# Patient Record
Sex: MALE | Race: WHITE | NOT HISPANIC OR LATINO | Employment: FULL TIME | URBAN - METROPOLITAN AREA
[De-identification: names, ages, dates, MRNs, and addresses within clinical notes are randomized per-mention and may not be internally consistent; named-entity substitution may affect disease eponyms.]

---

## 2017-01-13 DIAGNOSIS — E78.5 HYPERLIPEMIA: ICD-10-CM

## 2017-01-13 RX ORDER — ATORVASTATIN CALCIUM 10 MG/1
TABLET, FILM COATED ORAL
Qty: 90 TABLET | Refills: 1 | Status: SHIPPED | OUTPATIENT
Start: 2017-01-13 | End: 2017-07-31 | Stop reason: SDUPTHER

## 2017-02-20 ENCOUNTER — PATIENT MESSAGE (OUTPATIENT)
Dept: INTERNAL MEDICINE | Facility: CLINIC | Age: 62
End: 2017-02-20

## 2017-02-20 ENCOUNTER — TELEPHONE (OUTPATIENT)
Dept: INTERNAL MEDICINE | Facility: CLINIC | Age: 62
End: 2017-02-20

## 2017-02-20 ENCOUNTER — TELEPHONE (OUTPATIENT)
Dept: ENDOCRINOLOGY | Facility: CLINIC | Age: 62
End: 2017-02-20

## 2017-02-20 DIAGNOSIS — Z12.5 PROSTATE CANCER SCREENING: ICD-10-CM

## 2017-02-20 DIAGNOSIS — E11.42 TYPE 2 DIABETES MELLITUS WITH POLYNEUROPATHY: ICD-10-CM

## 2017-02-20 DIAGNOSIS — I10 UNSPECIFIED ESSENTIAL HYPERTENSION: ICD-10-CM

## 2017-02-20 DIAGNOSIS — E11.42 DIABETIC POLYNEUROPATHY ASSOCIATED WITH TYPE 2 DIABETES MELLITUS: Primary | ICD-10-CM

## 2017-02-20 RX ORDER — GLIMEPIRIDE 2 MG/1
2 TABLET ORAL
Qty: 30 TABLET | Refills: 11 | Status: SHIPPED | OUTPATIENT
Start: 2017-02-20 | End: 2018-02-22 | Stop reason: SDUPTHER

## 2017-02-20 NOTE — TELEPHONE ENCOUNTER
----- Message from Amy Duff sent at 2/20/2017  1:48 PM CST -----  Contact: Self  Doctor appointment and lab have been scheduled.  Please link lab orders to the lab appointment.  Date of doctor appointment: 4/18   Physical or EP:  Physical  Date of lab appointment:  4/10  Comments:

## 2017-02-20 NOTE — TELEPHONE ENCOUNTER
Spoke with pt, instructed he has not had a follow up visit since 2015 and the refill was denied, pt states he has been following up with PCP an will have his PCP handle the refill. Instructed pt to let us know if he would like to be seen again.

## 2017-04-10 ENCOUNTER — LAB VISIT (OUTPATIENT)
Dept: LAB | Facility: HOSPITAL | Age: 62
End: 2017-04-10
Attending: INTERNAL MEDICINE
Payer: COMMERCIAL

## 2017-04-10 DIAGNOSIS — I10 UNSPECIFIED ESSENTIAL HYPERTENSION: ICD-10-CM

## 2017-04-10 DIAGNOSIS — Z12.5 PROSTATE CANCER SCREENING: ICD-10-CM

## 2017-04-10 DIAGNOSIS — E11.42 DIABETIC POLYNEUROPATHY ASSOCIATED WITH TYPE 2 DIABETES MELLITUS: ICD-10-CM

## 2017-04-10 LAB
ALBUMIN SERPL BCP-MCNC: 3.8 G/DL
ALP SERPL-CCNC: 85 U/L
ALT SERPL W/O P-5'-P-CCNC: 34 U/L
ANION GAP SERPL CALC-SCNC: 11 MMOL/L
AST SERPL-CCNC: 21 U/L
BASOPHILS # BLD AUTO: 0.02 K/UL
BASOPHILS NFR BLD: 0.3 %
BILIRUB SERPL-MCNC: 0.5 MG/DL
BUN SERPL-MCNC: 27 MG/DL
CALCIUM SERPL-MCNC: 9.3 MG/DL
CHLORIDE SERPL-SCNC: 98 MMOL/L
CHOLEST/HDLC SERPL: 3.4 {RATIO}
CO2 SERPL-SCNC: 25 MMOL/L
COMPLEXED PSA SERPL-MCNC: 0.23 NG/ML
CREAT SERPL-MCNC: 1.6 MG/DL
DIFFERENTIAL METHOD: NORMAL
EOSINOPHIL # BLD AUTO: 0.2 K/UL
EOSINOPHIL NFR BLD: 2.4 %
ERYTHROCYTE [DISTWIDTH] IN BLOOD BY AUTOMATED COUNT: 12.7 %
EST. GFR  (AFRICAN AMERICAN): 53.7 ML/MIN/1.73 M^2
EST. GFR  (NON AFRICAN AMERICAN): 46.5 ML/MIN/1.73 M^2
ESTIMATED AVG GLUCOSE: 361 MG/DL
GLUCOSE SERPL-MCNC: 380 MG/DL
HBA1C MFR BLD HPLC: 14.2 %
HCT VFR BLD AUTO: 43 %
HDL/CHOLESTEROL RATIO: 29.1 %
HDLC SERPL-MCNC: 110 MG/DL
HDLC SERPL-MCNC: 32 MG/DL
HGB BLD-MCNC: 15.2 G/DL
LDLC SERPL CALC-MCNC: 52.8 MG/DL
LYMPHOCYTES # BLD AUTO: 2.4 K/UL
LYMPHOCYTES NFR BLD: 30.7 %
MCH RBC QN AUTO: 30.7 PG
MCHC RBC AUTO-ENTMCNC: 35.3 %
MCV RBC AUTO: 87 FL
MONOCYTES # BLD AUTO: 0.8 K/UL
MONOCYTES NFR BLD: 9.6 %
NEUTROPHILS # BLD AUTO: 4.4 K/UL
NEUTROPHILS NFR BLD: 56.7 %
NONHDLC SERPL-MCNC: 78 MG/DL
PLATELET # BLD AUTO: 197 K/UL
PMV BLD AUTO: 11.3 FL
POTASSIUM SERPL-SCNC: 5.6 MMOL/L
PROT SERPL-MCNC: 7.2 G/DL
RBC # BLD AUTO: 4.95 M/UL
SODIUM SERPL-SCNC: 134 MMOL/L
TRIGL SERPL-MCNC: 126 MG/DL
TSH SERPL DL<=0.005 MIU/L-ACNC: 3.2 UIU/ML
WBC # BLD AUTO: 7.81 K/UL

## 2017-04-10 PROCEDURE — 84153 ASSAY OF PSA TOTAL: CPT

## 2017-04-10 PROCEDURE — 84443 ASSAY THYROID STIM HORMONE: CPT

## 2017-04-10 PROCEDURE — 80053 COMPREHEN METABOLIC PANEL: CPT

## 2017-04-10 PROCEDURE — 85025 COMPLETE CBC W/AUTO DIFF WBC: CPT

## 2017-04-10 PROCEDURE — 83036 HEMOGLOBIN GLYCOSYLATED A1C: CPT

## 2017-04-10 PROCEDURE — 36415 COLL VENOUS BLD VENIPUNCTURE: CPT | Mod: PO

## 2017-04-10 PROCEDURE — 80061 LIPID PANEL: CPT

## 2017-04-18 ENCOUNTER — OFFICE VISIT (OUTPATIENT)
Dept: INTERNAL MEDICINE | Facility: CLINIC | Age: 62
End: 2017-04-18
Payer: COMMERCIAL

## 2017-04-18 VITALS
TEMPERATURE: 98 F | HEART RATE: 72 BPM | WEIGHT: 315 LBS | RESPIRATION RATE: 16 BRPM | SYSTOLIC BLOOD PRESSURE: 128 MMHG | HEIGHT: 71 IN | BODY MASS INDEX: 44.1 KG/M2 | DIASTOLIC BLOOD PRESSURE: 70 MMHG

## 2017-04-18 DIAGNOSIS — E66.01 MORBID OBESITY WITH BMI OF 40.0-44.9, ADULT: ICD-10-CM

## 2017-04-18 DIAGNOSIS — Z00.00 ANNUAL PHYSICAL EXAM: Primary | ICD-10-CM

## 2017-04-18 PROCEDURE — 3074F SYST BP LT 130 MM HG: CPT | Mod: S$GLB,,, | Performed by: INTERNAL MEDICINE

## 2017-04-18 PROCEDURE — 99999 PR PBB SHADOW E&M-EST. PATIENT-LVL III: CPT | Mod: PBBFAC,,, | Performed by: INTERNAL MEDICINE

## 2017-04-18 PROCEDURE — 3078F DIAST BP <80 MM HG: CPT | Mod: S$GLB,,, | Performed by: INTERNAL MEDICINE

## 2017-04-18 PROCEDURE — 99396 PREV VISIT EST AGE 40-64: CPT | Mod: S$GLB,,, | Performed by: INTERNAL MEDICINE

## 2017-04-18 RX ORDER — INSULIN GLARGINE 100 [IU]/ML
25 INJECTION, SOLUTION SUBCUTANEOUS NIGHTLY
Qty: 10 ML | Refills: 11 | Status: SHIPPED | OUTPATIENT
Start: 2017-04-18 | End: 2017-10-31 | Stop reason: SDUPTHER

## 2017-04-19 ENCOUNTER — PATIENT MESSAGE (OUTPATIENT)
Dept: INTERNAL MEDICINE | Facility: CLINIC | Age: 62
End: 2017-04-19

## 2017-04-20 ENCOUNTER — PATIENT MESSAGE (OUTPATIENT)
Dept: INTERNAL MEDICINE | Facility: CLINIC | Age: 62
End: 2017-04-20

## 2017-04-20 RX ORDER — SYRINGE,SAFETY WITH NEEDLE,1ML 25GX1"
SYRINGE (EA) MISCELLANEOUS
Qty: 100 EACH | Refills: 3 | Status: ON HOLD | OUTPATIENT
Start: 2017-04-20 | End: 2022-09-10

## 2017-04-20 NOTE — TELEPHONE ENCOUNTER
----- Message from Kandice Cruz sent at 4/20/2017  9:50 AM CDT -----  Contact: Mobile: [phone]   Estela patient would like to speak with you about his syringes.

## 2017-04-20 NOTE — PROGRESS NOTES
Subjective:       Patient ID: Alfa Ortiz is a 61 y.o. male.    Chief Complaint: Annual Exam (with labs to review)  HISTORY OF PRESENT ILLNESS:  A 61-year-old white male patient who is coming in   for annual review and is much better than the last few times I had seen him.    However, his blood sugars are over 300.  He was on a diabetic study at [facility].    The medications he was receiving actually made him worse both in terms of his   glucose and his asthma.  He sees Dr. Sharpe at [facility] who now has   his asthma under good control, but the patient feels that also has made his   diabetes worse.  He is, however, morbidly obese, not really on a strict diet or   an exercise program.  The patient was supposed to be on Januvia, which he never   did, so he is currently on only metformin 1000 mg twice daily and glyburide once   daily.  He also had been on insulin, which he is no longer on.    PHYSICAL EXAMINATION:  GENERAL:  The patient is markedly overweight.  His weight is 317.  VITAL SIGNS:  Otherwise normal.  SKIN:  Fair and healthy including his feet.  His feet were examined in detail.    He does have decreased sensation in forefoot and toes, but nowhere else.  Pulses   intact.  Good capillary filling.  There is no breakdown in the skin.  The   callusing on the foot is normal.  HEENT:  Clear.  NECK:  Shows no adenopathy, thyroid enlargement or bruit.  CHEST:  Clear.  HEART:  There is no murmur or gallop.  ABDOMEN:  Nontender without any organomegaly.  RECTAL:  Not done because of his size.  EXTREMITIES:  Show normal muscles, joints, pulses.  NEUROLOGIC:  Has the distal numbness in his feet.    IMPRESSION:  1.  Diabetes, poor control.  Start him back on Lantus 25 a day at night and then   he is going to be monitoring his sugars.  We will be modifying his dose weekly.  2.  Diabetic neuropathy.  3.  Asthma, now controlled.  4.  Morbid obesity.  5.  Hypertension.  6.  Hyperlipidemia, on medication.  I will  see him again in two weeks.      JDC/HN  dd: 04/20/2017 09:02:23 (CDT)  td: 04/20/2017 22:18:34 (CDT)  Doc ID   #[id number]  Job ID #[id number]    CC:     Dict [id number]  [facility]  Review of Systems   Constitutional: Negative for activity change, appetite change, fatigue and unexpected weight change.   HENT: Negative for dental problem, hearing loss, mouth sores, postnasal drip and sinus pressure.    Eyes: Negative for discharge and visual disturbance.   Respiratory: Positive for cough, shortness of breath and wheezing.    Cardiovascular: Negative for chest pain and palpitations.   Gastrointestinal: Negative for abdominal pain, blood in stool, constipation, diarrhea and nausea.   Genitourinary: Negative for dysuria, hematuria and testicular pain.   Musculoskeletal: Negative for arthralgias, back pain, joint swelling and neck pain.   Skin: Negative for rash.   Neurological: Positive for numbness. Negative for weakness and headaches.   Psychiatric/Behavioral: Negative for agitation and sleep disturbance.       Objective:      Physical Exam   Constitutional: He is oriented to person, place, and time. He appears well-developed and well-nourished.   HENT:   Head: Normocephalic.   Right Ear: External ear normal.   Left Ear: External ear normal.   Mouth/Throat: Oropharynx is clear and moist.   Eyes: EOM are normal. Pupils are equal, round, and reactive to light. Right eye exhibits no discharge.   Neck: Normal range of motion. No JVD present. No tracheal deviation present. No thyromegaly present.   Cardiovascular: Normal rate, regular rhythm, normal heart sounds and intact distal pulses.  Exam reveals no gallop.    No murmur heard.  Pulmonary/Chest: Effort normal and breath sounds normal. He has no wheezes. He has no rales.   Abdominal: Soft. Bowel sounds are normal. He exhibits no mass. There is no tenderness.   Genitourinary: Prostate normal and penis normal. Rectal exam shows guaiac negative stool.   Musculoskeletal: Normal range of  motion. He exhibits no edema.   Lymphadenopathy:     He has no cervical adenopathy.   Neurological: He is oriented to person, place, and time. He displays normal reflexes. No cranial nerve deficit. Coordination normal.   Skin: Skin is warm. No rash noted. No pallor.   Psychiatric: He has a normal mood and affect.       Assessment:       1. Annual physical exam    2. Morbid obesity with BMI of 40.0-44.9, adult        Plan:

## 2017-04-20 NOTE — TELEPHONE ENCOUNTER
He likes the lantus in vial form.  It is a lot cheaper than the lantus pen but he needs the syringe with needle too.    i loaded for approval to [facility].  i gave in instructions on how to draw up in syringe. It has been a while since he has done that.    Please approve.  Thanks latoya

## 2017-04-23 ENCOUNTER — PATIENT MESSAGE (OUTPATIENT)
Dept: INTERNAL MEDICINE | Facility: CLINIC | Age: 62
End: 2017-04-23

## 2017-05-02 ENCOUNTER — PATIENT MESSAGE (OUTPATIENT)
Dept: INTERNAL MEDICINE | Facility: CLINIC | Age: 62
End: 2017-05-02

## 2017-05-04 ENCOUNTER — OFFICE VISIT (OUTPATIENT)
Dept: INTERNAL MEDICINE | Facility: CLINIC | Age: 62
End: 2017-05-04
Payer: COMMERCIAL

## 2017-05-04 VITALS
HEIGHT: 71 IN | WEIGHT: 315 LBS | TEMPERATURE: 98 F | HEART RATE: 97 BPM | RESPIRATION RATE: 16 BRPM | BODY MASS INDEX: 44.1 KG/M2 | DIASTOLIC BLOOD PRESSURE: 72 MMHG | SYSTOLIC BLOOD PRESSURE: 140 MMHG

## 2017-05-04 DIAGNOSIS — E66.01 MORBID OBESITY WITH BMI OF 40.0-44.9, ADULT: Primary | ICD-10-CM

## 2017-05-04 DIAGNOSIS — E11.42 DIABETIC POLYNEUROPATHY ASSOCIATED WITH TYPE 2 DIABETES MELLITUS: ICD-10-CM

## 2017-05-04 DIAGNOSIS — E11.9 TYPE 2 DIABETES MELLITUS WITHOUT COMPLICATION, WITHOUT LONG-TERM CURRENT USE OF INSULIN: ICD-10-CM

## 2017-05-04 DIAGNOSIS — I10 ESSENTIAL HYPERTENSION: ICD-10-CM

## 2017-05-04 PROCEDURE — 4010F ACE/ARB THERAPY RXD/TAKEN: CPT | Mod: S$GLB,,, | Performed by: INTERNAL MEDICINE

## 2017-05-04 PROCEDURE — 1160F RVW MEDS BY RX/DR IN RCRD: CPT | Mod: S$GLB,,, | Performed by: INTERNAL MEDICINE

## 2017-05-04 PROCEDURE — 3078F DIAST BP <80 MM HG: CPT | Mod: S$GLB,,, | Performed by: INTERNAL MEDICINE

## 2017-05-04 PROCEDURE — 99213 OFFICE O/P EST LOW 20 MIN: CPT | Mod: S$GLB,,, | Performed by: INTERNAL MEDICINE

## 2017-05-04 PROCEDURE — 3077F SYST BP >= 140 MM HG: CPT | Mod: S$GLB,,, | Performed by: INTERNAL MEDICINE

## 2017-05-04 PROCEDURE — 99999 PR PBB SHADOW E&M-EST. PATIENT-LVL III: CPT | Mod: PBBFAC,,, | Performed by: INTERNAL MEDICINE

## 2017-05-04 PROCEDURE — 3046F HEMOGLOBIN A1C LEVEL >9.0%: CPT | Mod: S$GLB,,, | Performed by: INTERNAL MEDICINE

## 2017-05-08 NOTE — PROGRESS NOTES
Subjective:       Patient ID: Alfa Ortiz is a 61 y.o. male.    Chief Complaint: Follow-up (2 wk for diabetic check)  Dictation #1  MRN:[id number]  CSN:[id number]  Dict [id number]  HPI  Review of Systems   Constitutional: Negative for activity change, appetite change, fatigue and unexpected weight change.   HENT: Negative for dental problem, hearing loss, mouth sores, postnasal drip and sinus pressure.    Eyes: Negative for discharge and visual disturbance.   Respiratory: Negative for cough and shortness of breath.    Cardiovascular: Negative for chest pain and palpitations.   Gastrointestinal: Negative for abdominal pain, blood in stool, constipation, diarrhea and nausea.   Genitourinary: Negative for dysuria, hematuria and testicular pain.   Musculoskeletal: Negative for arthralgias, back pain, joint swelling and neck pain.   Skin: Negative for rash.   Neurological: Negative for weakness and headaches.   Psychiatric/Behavioral: Negative for agitation and sleep disturbance.       Objective:      Physical Exam   Constitutional: He is oriented to person, place, and time. He appears well-developed and well-nourished.   HENT:   Head: Normocephalic.   Right Ear: External ear normal.   Left Ear: External ear normal.   Mouth/Throat: Oropharynx is clear and moist.   Eyes: EOM are normal. Pupils are equal, round, and reactive to light. Right eye exhibits no discharge.   Neck: Normal range of motion. No JVD present. No tracheal deviation present. No thyromegaly present.   Cardiovascular: Normal rate, regular rhythm, normal heart sounds and intact distal pulses.  Exam reveals no gallop.    No murmur heard.  Pulmonary/Chest: Effort normal and breath sounds normal. He has no wheezes. He has no rales.   Abdominal: Soft. Bowel sounds are normal. He exhibits no mass. There is no tenderness.   Genitourinary: Prostate normal and penis normal. Rectal exam shows guaiac negative stool.   Musculoskeletal: Normal range of motion. He exhibits no  edema.   Lymphadenopathy:     He has no cervical adenopathy.   Neurological: He is oriented to person, place, and time. He displays normal reflexes. No cranial nerve deficit. Coordination normal.   Skin: Skin is warm. No rash noted. No pallor.   Psychiatric: He has a normal mood and affect.       Assessment:       1. Morbid obesity with BMI of 40.0-44.9, adult    2. Diabetic polyneuropathy associated with type 2 diabetes mellitus    3. Type 2 diabetes mellitus without complication, without long-term current use of insulin    4. Essential hypertension        Plan:

## 2017-05-08 NOTE — PROGRESS NOTES
SUBJECTIVE:  The patient is seen on 05/04/2017, for followup on his diabetes.    The patient had been started on Lantus insulin 25 units q.p.m.  The patient   brought his glucoses in and his blood sugars have gone from the low 300s down to   the mid to low 200s.  He, however, two days ago, had a blood sugar of 310.  The   patient was last seen two weeks ago.  His weight is the same and he is markedly   obese.  He has had no problems with the insulin.  He is familiar with insulin   dosing.  He is having no problems with the injections.  He actually had the   Lantus from before and he is using those until they are out.  I ordered a   multi-dose vial, which he prefers.    PHYSICAL EXAMINATION:  Overall is okay.  VITAL SIGNS:  Normal.  I did not do a detailed exam on this occasion.    I have increased his Lantus to 30 units and told him every few days, he can go   up 5 units until his blood sugars are down in the 100s and if all is well, I   will see him in one month.  In the meantime, he is to call me if his blood   sugars are getting in the 100s.      YANIRA/MARY  dd: 05/07/2017 22:17:42 (CDT)  td: 05/08/2017 14:18:39 (CDT)  Doc ID   #[id number]  Job ID #[id number]    CC:

## 2017-06-28 ENCOUNTER — PATIENT MESSAGE (OUTPATIENT)
Dept: INTERNAL MEDICINE | Facility: CLINIC | Age: 62
End: 2017-06-28

## 2017-06-29 ENCOUNTER — PATIENT MESSAGE (OUTPATIENT)
Dept: INTERNAL MEDICINE | Facility: CLINIC | Age: 62
End: 2017-06-29

## 2017-06-29 ENCOUNTER — TELEPHONE (OUTPATIENT)
Dept: INTERNAL MEDICINE | Facility: CLINIC | Age: 62
End: 2017-06-29

## 2017-06-29 NOTE — TELEPHONE ENCOUNTER
"patient sent email stating "the last visit with Dr. Joyner was coded with code E66.01 Morbid Obesity as primary. My insurance will not pay for the office visit with that code.   Can the primary diagnoise be changed and use the E66.01 as a secondary  Please advise.  "     If this is ok, I need you to go to chart review, look at his last visit encounter and make an addendum changing the dx to 2nd or 3rd dx.    Please let me know if you do this so I can get billing to refile the claim to [facility].    Thanks latoya  "

## 2017-07-12 RX ORDER — METFORMIN HYDROCHLORIDE 1000 MG/1
TABLET ORAL
Qty: 60 TABLET | Refills: 5 | Status: SHIPPED | OUTPATIENT
Start: 2017-07-12 | End: 2017-12-05 | Stop reason: SDUPTHER

## 2017-07-21 ENCOUNTER — OFFICE VISIT (OUTPATIENT)
Dept: OPTOMETRY | Facility: CLINIC | Age: 62
End: 2017-07-21
Payer: COMMERCIAL

## 2017-07-21 DIAGNOSIS — E11.9 TYPE 2 DIABETES MELLITUS WITHOUT RETINOPATHY: Primary | ICD-10-CM

## 2017-07-21 DIAGNOSIS — H25.13 NUCLEAR SCLEROSIS, BILATERAL: ICD-10-CM

## 2017-07-21 PROCEDURE — 92015 DETERMINE REFRACTIVE STATE: CPT | Mod: S$GLB,,, | Performed by: OPTOMETRIST

## 2017-07-21 PROCEDURE — 99999 PR PBB SHADOW E&M-EST. PATIENT-LVL II: CPT | Mod: PBBFAC,,, | Performed by: OPTOMETRIST

## 2017-07-21 PROCEDURE — 92014 COMPRE OPH EXAM EST PT 1/>: CPT | Mod: S$GLB,,, | Performed by: OPTOMETRIST

## 2017-07-21 NOTE — PROGRESS NOTES
HPI     Annual Exam    Additional comments: Diabetic patient, eye exam            Comments   Mr. Ortiz 62 y/o male, here today for an annual eye exam. He states he is   diabetic and also has asthma which he is on medication to control that   which increases his sugar levels, so he is having a lot of vision changes.   He uses a variety of glasses to do different activities depending on what   he is trying to see or do.   No pain.  No drops.   Hemoglobin A1C       Date                     Value               Ref Range             Status                04/10/2017               14.2 (H)            4.5 - 6.2 %           Final              Comment:    According to ADA guidelines, hemoglobin A1C <7.0% represents  optimal   control in non-pregnant diabetic patients.  Different  metrics may apply   to specific populations.   Standards of Medical Care in Diabetes -   2016.  For the purpose of screening for the presence of diabetes:  <5.7%       Consistent with the absence of diabetes  5.7-6.4%  Consistent with   increasing risk for diabetes   (prediabetes)  >or=6.5%  Consistent with   diabetes  Currently no consensus exists for use of hemoglobin A1C  for   diagnosis of diabetes for children.         10/18/2016               9.5 (H)             4.5 - 6.2 %           Final              Comment:    According to ADA guidelines, hemoglobin A1C <7.0% represents  optimal   control in non-pregnant diabetic patients.  Different  metrics may apply   to specific populations.   Standards of Medical Care in Diabetes -   2016.  For the purpose of screening for the presence of diabetes:  <5.7%       Consistent with the absence of diabetes  5.7-6.4%  Consistent with   increasing risk for diabetes   (prediabetes)  >or=6.5%  Consistent with   diabetes  Currently no consensus exists for use of hemoglobin A1C  for   diagnosis of diabetes for children.         04/11/2016               7.1 (H)             4.5 - 6.2 %           Final             ----------         Last edited by Marbin Valencia, OD on 7/21/2017  7:57 AM. (History)        ROS     Positive for: Endocrine (DM), Cardiovascular (HTN)    Negative for: Constitutional, Gastrointestinal, Neurological, Skin,   Genitourinary, Musculoskeletal, HENT, Eyes, Respiratory, Psychiatric,   Allergic/Imm, Heme/Lymph    Last edited by Marbin Valencia, OD on 7/21/2017  7:57 AM. (History)        Assessment /Plan     For exam results, see Encounter Report.    Type 2 diabetes mellitus without retinopathy    Nuclear sclerosis, bilateral      1. Cat OU--pt happy w otc readers (seperate dist/computer readers)  2. DM- WITHOUT RETINOPATHY.  Advised yearly DFE    PLAN:    rtc 1 yr

## 2017-07-24 ENCOUNTER — PATIENT MESSAGE (OUTPATIENT)
Dept: INTERNAL MEDICINE | Facility: CLINIC | Age: 62
End: 2017-07-24

## 2017-07-26 DIAGNOSIS — E78.5 HYPERLIPEMIA: ICD-10-CM

## 2017-07-26 RX ORDER — ATORVASTATIN CALCIUM 10 MG/1
10 TABLET, FILM COATED ORAL DAILY
Qty: 90 TABLET | Refills: 1 | Status: CANCELLED | OUTPATIENT
Start: 2017-07-26

## 2017-07-27 RX ORDER — LISINOPRIL 40 MG/1
TABLET ORAL
Qty: 30 TABLET | Refills: 5 | Status: SHIPPED | OUTPATIENT
Start: 2017-07-27 | End: 2018-01-26 | Stop reason: SDUPTHER

## 2017-07-30 ENCOUNTER — PATIENT MESSAGE (OUTPATIENT)
Dept: INTERNAL MEDICINE | Facility: CLINIC | Age: 62
End: 2017-07-30

## 2017-07-30 DIAGNOSIS — E78.5 HYPERLIPIDEMIA, UNSPECIFIED HYPERLIPIDEMIA TYPE: ICD-10-CM

## 2017-08-04 RX ORDER — ATORVASTATIN CALCIUM 10 MG/1
10 TABLET, FILM COATED ORAL DAILY
Qty: 90 TABLET | Refills: 3 | Status: SHIPPED | OUTPATIENT
Start: 2017-08-04

## 2017-10-31 ENCOUNTER — OFFICE VISIT (OUTPATIENT)
Dept: INTERNAL MEDICINE | Facility: CLINIC | Age: 62
End: 2017-10-31
Payer: COMMERCIAL

## 2017-10-31 VITALS
RESPIRATION RATE: 16 BRPM | WEIGHT: 315 LBS | SYSTOLIC BLOOD PRESSURE: 148 MMHG | DIASTOLIC BLOOD PRESSURE: 81 MMHG | BODY MASS INDEX: 46.65 KG/M2 | HEART RATE: 97 BPM | TEMPERATURE: 99 F | HEIGHT: 69 IN

## 2017-10-31 DIAGNOSIS — E11.42 TYPE 2 DIABETES MELLITUS WITH POLYNEUROPATHY: ICD-10-CM

## 2017-10-31 DIAGNOSIS — E11.9 TYPE 2 DIABETES MELLITUS WITHOUT COMPLICATION, WITHOUT LONG-TERM CURRENT USE OF INSULIN: ICD-10-CM

## 2017-10-31 DIAGNOSIS — E66.01 MORBID OBESITY WITH BMI OF 40.0-44.9, ADULT: Primary | ICD-10-CM

## 2017-10-31 DIAGNOSIS — J45.20 MILD INTERMITTENT ASTHMA WITHOUT COMPLICATION: ICD-10-CM

## 2017-10-31 DIAGNOSIS — E78.5 HYPERLIPIDEMIA, UNSPECIFIED HYPERLIPIDEMIA TYPE: ICD-10-CM

## 2017-10-31 DIAGNOSIS — I10 ESSENTIAL HYPERTENSION: ICD-10-CM

## 2017-10-31 PROCEDURE — 99214 OFFICE O/P EST MOD 30 MIN: CPT | Mod: S$GLB,,, | Performed by: INTERNAL MEDICINE

## 2017-10-31 PROCEDURE — 99999 PR PBB SHADOW E&M-EST. PATIENT-LVL III: CPT | Mod: PBBFAC,,, | Performed by: INTERNAL MEDICINE

## 2017-10-31 RX ORDER — INSULIN GLARGINE 100 [IU]/ML
50 INJECTION, SOLUTION SUBCUTANEOUS NIGHTLY
Qty: 10 ML | Refills: 11 | Status: SHIPPED | OUTPATIENT
Start: 2017-10-31 | End: 2018-06-09 | Stop reason: SDUPTHER

## 2017-11-06 NOTE — PROGRESS NOTES
Subjective:       Patient ID: Alfa Ortiz is a 62 y.o. male.    Chief Complaint: Follow-up (6 month)  HISTORY OF PRESENT ILLNESS:  A 62-year-old white male with diabetes who said his   blood sugars have been running between 230 and 500 and he has been under dosing   his Lantus to minimize the expense since he has to refill it if he goes up on   the dose before he is due for a refill.  He is not having any symptoms of high   sugars.  He has not been finding himself thirsty or urinating more frequently.    He had been seeing Endocrine, he stopped doing that.  The patient's last blood   work was done in April.  His hemoglobin A1c was 14.2.  His sugar at that time   was 380 and a year prior to that had been 130.  The patient has maintained his   weight, but actually lost one kilograms since the last visit.    PHYSICAL EXAMINATION:  VITAL SIGNS:  Normal including his blood pressure.  SKIN:  Fair and healthy.  HEENT:  Clear.  NECK:  Shows no thyroid enlargement or bruit.  CHEST:  Clear.  ABDOMEN:  Huge.  HEART:  There is no murmur or gallop.  EXTREMITIES:  Show no edema.  Good pulses, and there is no thickening of the   skin of his legs.    IMPRESSION AND PLAN:  Diabetes, very poorly controlled.  We had a very lengthy   discussion about his medication, which includes Lantus, metformin 1000 b.i.d.    The patient has also got asthma and is on medication, which includes an inhaled   steroid.  The patient also has hypertension.  I have increased his Lantus back   to 50 units at night with a sliding scale, so I have given him a higher dose in   his periodic dosing, with the objective of getting his sugars down to the 100   range.  We had a brief discussion about him seeing Endocrine.  He was not   inclined to do that since he did not find it very much helped.  I will repeat   his studies in four weeks with labs and he is going to bring his glucoses in at   that time.      YANIRA/MARY  dd: 11/05/2017 21:57:16 (CST)  td: 11/06/2017  03:03:52 ([facility])  Doc ID   #[id number]  Job ID #[id number]    CC:     Dict   HPI  Review of Systems   Constitutional: Negative for activity change, appetite change, fatigue and unexpected weight change.   HENT: Negative for dental problem, hearing loss, mouth sores, postnasal drip and sinus pressure.    Eyes: Negative for discharge and visual disturbance.   Respiratory: Negative for cough and shortness of breath.    Cardiovascular: Negative for chest pain and palpitations.   Gastrointestinal: Negative for abdominal pain, blood in stool, constipation, diarrhea and nausea.   Genitourinary: Negative for dysuria, hematuria and testicular pain.   Musculoskeletal: Negative for arthralgias, back pain, joint swelling and neck pain.   Skin: Negative for rash.   Neurological: Negative for weakness and headaches.   Psychiatric/Behavioral: Negative for agitation and sleep disturbance.       Objective:      Physical Exam   Constitutional: He is oriented to person, place, and time. He appears well-developed and well-nourished.   HENT:   Head: Normocephalic.   Right Ear: External ear normal.   Left Ear: External ear normal.   Mouth/Throat: Oropharynx is clear and moist.   Eyes: EOM are normal. Pupils are equal, round, and reactive to light. Right eye exhibits no discharge.   Neck: Normal range of motion. No JVD present. No tracheal deviation present. No thyromegaly present.   Cardiovascular: Normal rate, regular rhythm, normal heart sounds and intact distal pulses.  Exam reveals no gallop.    No murmur heard.  Pulmonary/Chest: Effort normal and breath sounds normal. He has no wheezes. He has no rales.   Abdominal: Soft. Bowel sounds are normal. He exhibits no mass. There is no tenderness.   Genitourinary: Prostate normal and penis normal. Rectal exam shows guaiac negative stool.   Musculoskeletal: Normal range of motion. He exhibits no edema.   Lymphadenopathy:     He has no cervical adenopathy.   Neurological: He is oriented to person,  place, and time. He displays normal reflexes. No cranial nerve deficit. Coordination normal.   Skin: Skin is warm. No rash noted. No pallor.   Psychiatric: He has a normal mood and affect.       Assessment:       1. Morbid obesity with BMI of 40.0-44.9, adult        Plan:

## 2017-11-08 ENCOUNTER — TELEPHONE (OUTPATIENT)
Dept: INTERNAL MEDICINE | Facility: CLINIC | Age: 62
End: 2017-11-08

## 2017-11-30 ENCOUNTER — LAB VISIT (OUTPATIENT)
Dept: LAB | Facility: HOSPITAL | Age: 62
End: 2017-11-30
Attending: INTERNAL MEDICINE
Payer: COMMERCIAL

## 2017-11-30 DIAGNOSIS — E11.42 TYPE 2 DIABETES MELLITUS WITH POLYNEUROPATHY: ICD-10-CM

## 2017-11-30 DIAGNOSIS — E66.01 MORBID OBESITY WITH BMI OF 40.0-44.9, ADULT: ICD-10-CM

## 2017-11-30 LAB
ANION GAP SERPL CALC-SCNC: 8 MMOL/L
BUN SERPL-MCNC: 24 MG/DL
CALCIUM SERPL-MCNC: 9.2 MG/DL
CHLORIDE SERPL-SCNC: 101 MMOL/L
CO2 SERPL-SCNC: 27 MMOL/L
CREAT SERPL-MCNC: 1.6 MG/DL
EST. GFR  (AFRICAN AMERICAN): 53.3 ML/MIN/1.73 M^2
EST. GFR  (NON AFRICAN AMERICAN): 46.1 ML/MIN/1.73 M^2
ESTIMATED AVG GLUCOSE: 286 MG/DL
GLUCOSE SERPL-MCNC: 223 MG/DL
HBA1C MFR BLD HPLC: 11.6 %
POTASSIUM SERPL-SCNC: 4.9 MMOL/L
SODIUM SERPL-SCNC: 136 MMOL/L

## 2017-11-30 PROCEDURE — 36415 COLL VENOUS BLD VENIPUNCTURE: CPT | Mod: PO

## 2017-11-30 PROCEDURE — 83036 HEMOGLOBIN GLYCOSYLATED A1C: CPT

## 2017-11-30 PROCEDURE — 80048 BASIC METABOLIC PNL TOTAL CA: CPT

## 2017-12-05 ENCOUNTER — OFFICE VISIT (OUTPATIENT)
Dept: INTERNAL MEDICINE | Facility: CLINIC | Age: 62
End: 2017-12-05
Payer: COMMERCIAL

## 2017-12-05 VITALS
BODY MASS INDEX: 45.1 KG/M2 | RESPIRATION RATE: 16 BRPM | TEMPERATURE: 98 F | HEART RATE: 90 BPM | SYSTOLIC BLOOD PRESSURE: 150 MMHG | DIASTOLIC BLOOD PRESSURE: 91 MMHG | HEIGHT: 70 IN | WEIGHT: 315 LBS

## 2017-12-05 DIAGNOSIS — E11.42 DIABETIC POLYNEUROPATHY ASSOCIATED WITH TYPE 2 DIABETES MELLITUS: ICD-10-CM

## 2017-12-05 DIAGNOSIS — E10.42 TYPE 1 DIABETES MELLITUS WITH DIABETIC POLYNEUROPATHY: ICD-10-CM

## 2017-12-05 DIAGNOSIS — E78.5 HYPERLIPIDEMIA, UNSPECIFIED HYPERLIPIDEMIA TYPE: ICD-10-CM

## 2017-12-05 DIAGNOSIS — E66.01 MORBID OBESITY WITH BMI OF 40.0-44.9, ADULT: Primary | ICD-10-CM

## 2017-12-05 PROCEDURE — 99214 OFFICE O/P EST MOD 30 MIN: CPT | Mod: S$GLB,,, | Performed by: INTERNAL MEDICINE

## 2017-12-05 PROCEDURE — 99999 PR PBB SHADOW E&M-EST. PATIENT-LVL III: CPT | Mod: PBBFAC,,, | Performed by: INTERNAL MEDICINE

## 2017-12-05 RX ORDER — METFORMIN HYDROCHLORIDE 1000 MG/1
1000 TABLET ORAL 2 TIMES DAILY
Qty: 60 TABLET | Refills: 11 | Status: SHIPPED | OUTPATIENT
Start: 2017-12-05

## 2017-12-18 NOTE — PROGRESS NOTES
Subjective:       Patient ID: Alfa Ortiz is a 62 y.o. male.    Chief Complaint: Follow-up  A 62-year-old white male who is coming back in having seen me for his annual   visit on 10/31/2017, and discussing his diabetes, which was very poorly   controlled.  It appears that the problem was he was not dosing his Lantus   properly because he wanted to save money, so arrangements for that were   rewritten.  He has now increased his insulin to 55-60 units.  Blood work was   done prior to this visit showing glucose to drop to 223 from the last one being   380, creatinine was still 1.6.  Hemoglobin A1c was 11.6, which was an   improvement from over 14.  He feels better.  I told him to slowly increase his   Lantus dose to get his blood sugars in the low 100s, but to be careful about   overdosing.  He told me he had one episode  where he thought his sugar was low.    He checked it when he felt lightheaded, it was over 200, in fact the   measurement was 213.  I told him mostly the problems will be at night, to   continue to work on exercise, weight loss program.  His weight is no different   than last time I had seen him.  I will see him again in four months.      JSAADIA/HN  dd: 12/18/2017 00:56:59 (CST)  td: 12/18/2017 06:45:21 (CST)  Doc ID   #[id number]  Job ID #[id number]    CC:     [facility] [id number]  [facility]  Review of Systems   Constitutional: Negative for activity change, appetite change, fatigue and unexpected weight change.   HENT: Negative for dental problem, hearing loss, mouth sores, postnasal drip and sinus pressure.    Eyes: Negative for discharge and visual disturbance.   Respiratory: Negative for cough and shortness of breath.    Cardiovascular: Negative for chest pain and palpitations.   Gastrointestinal: Negative for abdominal pain, blood in stool, constipation, diarrhea and nausea.   Genitourinary: Negative for dysuria, hematuria and testicular pain.   Musculoskeletal: Negative for arthralgias, back pain, joint swelling and  neck pain.   Skin: Negative for rash.   Neurological: Positive for light-headedness. Negative for weakness and headaches.   Psychiatric/Behavioral: Negative for agitation and sleep disturbance.       Objective:      Physical Exam   Constitutional: He is oriented to person, place, and time. He appears well-developed and well-nourished.   HENT:   Head: Normocephalic.   Right Ear: External ear normal.   Left Ear: External ear normal.   Mouth/Throat: Oropharynx is clear and moist.   Eyes: EOM are normal. Pupils are equal, round, and reactive to light. Right eye exhibits no discharge.   Neck: Normal range of motion. No JVD present. No tracheal deviation present. No thyromegaly present.   Cardiovascular: Normal rate, regular rhythm, normal heart sounds and intact distal pulses.  Exam reveals no gallop.    No murmur heard.  Pulmonary/Chest: Effort normal and breath sounds normal. He has no wheezes. He has no rales.   Abdominal: Soft. Bowel sounds are normal. He exhibits no mass. There is no tenderness.   Genitourinary: Prostate normal and penis normal. Rectal exam shows guaiac negative stool.   Musculoskeletal: Normal range of motion. He exhibits no edema.   Lymphadenopathy:     He has no cervical adenopathy.   Neurological: He is oriented to person, place, and time. He displays normal reflexes. No cranial nerve deficit. Coordination normal.   Skin: Skin is warm. No rash noted. No pallor.   Psychiatric: He has a normal mood and affect.       Assessment:       No diagnosis found.    Plan:

## 2018-01-27 RX ORDER — LISINOPRIL 40 MG/1
TABLET ORAL
Qty: 30 TABLET | Refills: 5 | Status: SHIPPED | OUTPATIENT
Start: 2018-01-27

## 2018-02-22 DIAGNOSIS — E11.42 TYPE 2 DIABETES MELLITUS WITH POLYNEUROPATHY: ICD-10-CM

## 2018-02-23 RX ORDER — GLIMEPIRIDE 2 MG/1
TABLET ORAL
Qty: 30 TABLET | Refills: 11 | Status: SHIPPED | OUTPATIENT
Start: 2018-02-23

## 2018-06-11 RX ORDER — INSULIN GLARGINE 100 [IU]/ML
INJECTION, SOLUTION SUBCUTANEOUS
Qty: 10 ML | Refills: 11 | Status: ON HOLD | OUTPATIENT
Start: 2018-06-11 | End: 2022-09-10

## 2018-09-14 ENCOUNTER — TELEPHONE (OUTPATIENT)
Dept: RESEARCH | Facility: OTHER | Age: 63
End: 2018-09-14

## 2018-09-14 NOTE — TELEPHONE ENCOUNTER
Spoke with patient regarding participating in research for neuropathic pain. Patient declined participation and expressed he's not interested in participating in research.

## 2019-08-21 ENCOUNTER — TELEPHONE (OUTPATIENT)
Dept: INTERNAL MEDICINE | Facility: CLINIC | Age: 64
End: 2019-08-21

## 2019-08-21 NOTE — TELEPHONE ENCOUNTER
I called to try and book annual past due.    He changed dr's to lindy.  I told him to let us know If we can anything to help in future.

## 2019-10-22 ENCOUNTER — PATIENT OUTREACH (OUTPATIENT)
Dept: ADMINISTRATIVE | Facility: HOSPITAL | Age: 64
End: 2019-10-22

## 2022-09-10 ENCOUNTER — HOSPITAL ENCOUNTER (INPATIENT)
Facility: HOSPITAL | Age: 67
LOS: 5 days | Discharge: HOME | DRG: 871 | End: 2022-09-15
Attending: EMERGENCY MEDICINE | Admitting: INTERNAL MEDICINE
Payer: MEDICARE

## 2022-09-10 DIAGNOSIS — R78.81 BACTEREMIA DUE TO GROUP B STREPTOCOCCUS: ICD-10-CM

## 2022-09-10 DIAGNOSIS — E66.01 MORBID OBESITY WITH BMI OF 40.0-44.9, ADULT: ICD-10-CM

## 2022-09-10 DIAGNOSIS — E11.42 TYPE 2 DIABETES MELLITUS WITH DIABETIC POLYNEUROPATHY, UNSPECIFIED WHETHER LONG TERM INSULIN USE: ICD-10-CM

## 2022-09-10 DIAGNOSIS — R50.9 FEVER, UNSPECIFIED FEVER CAUSE: ICD-10-CM

## 2022-09-10 DIAGNOSIS — A41.9 SEPSIS, DUE TO UNSPECIFIED ORGANISM, UNSPECIFIED WHETHER ACUTE ORGAN DYSFUNCTION PRESENT: Primary | ICD-10-CM

## 2022-09-10 DIAGNOSIS — B95.61 MSSA BACTEREMIA: ICD-10-CM

## 2022-09-10 DIAGNOSIS — R78.81 BACTEREMIA: ICD-10-CM

## 2022-09-10 DIAGNOSIS — L03.115 CELLULITIS OF RIGHT LOWER EXTREMITY: ICD-10-CM

## 2022-09-10 DIAGNOSIS — R50.9 FEVER: ICD-10-CM

## 2022-09-10 DIAGNOSIS — B95.1 BACTEREMIA DUE TO GROUP B STREPTOCOCCUS: ICD-10-CM

## 2022-09-10 DIAGNOSIS — R78.81 MSSA BACTEREMIA: ICD-10-CM

## 2022-09-10 DIAGNOSIS — D72.829 LEUKOCYTOSIS, UNSPECIFIED TYPE: ICD-10-CM

## 2022-09-10 DIAGNOSIS — J45.21 MILD INTERMITTENT ASTHMATIC BRONCHITIS WITH ACUTE EXACERBATION: ICD-10-CM

## 2022-09-10 DIAGNOSIS — R79.89 ELEVATED LACTIC ACID LEVEL: ICD-10-CM

## 2022-09-10 DIAGNOSIS — K92.0 COFFEE GROUND EMESIS: ICD-10-CM

## 2022-09-10 LAB
ABO + RH BLD: NORMAL
ALBUMIN SERPL BCP-MCNC: 3.7 G/DL (ref 3.5–5.2)
ALBUMIN SERPL BCP-MCNC: 3.7 G/DL (ref 3.5–5.2)
ALP SERPL-CCNC: 76 U/L (ref 55–135)
ALP SERPL-CCNC: 76 U/L (ref 55–135)
ALT SERPL W/O P-5'-P-CCNC: 29 U/L (ref 10–44)
ALT SERPL W/O P-5'-P-CCNC: 29 U/L (ref 10–44)
ANION GAP SERPL CALC-SCNC: 11 MMOL/L (ref 8–16)
ANION GAP SERPL CALC-SCNC: 11 MMOL/L (ref 8–16)
AST SERPL-CCNC: 24 U/L (ref 10–40)
AST SERPL-CCNC: 24 U/L (ref 10–40)
BACTERIA #/AREA URNS HPF: NORMAL /HPF
BASOPHILS # BLD AUTO: 0.03 K/UL (ref 0–0.2)
BASOPHILS # BLD AUTO: 0.03 K/UL (ref 0–0.2)
BASOPHILS NFR BLD: 0.2 % (ref 0–1.9)
BASOPHILS NFR BLD: 0.2 % (ref 0–1.9)
BILIRUB SERPL-MCNC: 0.5 MG/DL (ref 0.1–1)
BILIRUB SERPL-MCNC: 0.5 MG/DL (ref 0.1–1)
BILIRUB UR QL STRIP: NEGATIVE
BLD GP AB SCN CELLS X3 SERPL QL: NORMAL
BUN SERPL-MCNC: 26 MG/DL (ref 8–23)
BUN SERPL-MCNC: 26 MG/DL (ref 8–23)
CALCIUM SERPL-MCNC: 9.4 MG/DL (ref 8.7–10.5)
CALCIUM SERPL-MCNC: 9.4 MG/DL (ref 8.7–10.5)
CHLORIDE SERPL-SCNC: 103 MMOL/L (ref 95–110)
CHLORIDE SERPL-SCNC: 103 MMOL/L (ref 95–110)
CLARITY UR: CLEAR
CO2 SERPL-SCNC: 21 MMOL/L (ref 23–29)
CO2 SERPL-SCNC: 21 MMOL/L (ref 23–29)
COLOR UR: YELLOW
CREAT SERPL-MCNC: 1.8 MG/DL (ref 0.5–1.4)
CREAT SERPL-MCNC: 1.8 MG/DL (ref 0.5–1.4)
CTP QC/QA: YES
DIFFERENTIAL METHOD: ABNORMAL
DIFFERENTIAL METHOD: ABNORMAL
EOSINOPHIL # BLD AUTO: 0 K/UL (ref 0–0.5)
EOSINOPHIL # BLD AUTO: 0 K/UL (ref 0–0.5)
EOSINOPHIL NFR BLD: 0.1 % (ref 0–8)
EOSINOPHIL NFR BLD: 0.1 % (ref 0–8)
ERYTHROCYTE [DISTWIDTH] IN BLOOD BY AUTOMATED COUNT: 12.5 % (ref 11.5–14.5)
ERYTHROCYTE [DISTWIDTH] IN BLOOD BY AUTOMATED COUNT: 12.5 % (ref 11.5–14.5)
EST. GFR  (NO RACE VARIABLE): 41.3 ML/MIN/1.73 M^2
EST. GFR  (NO RACE VARIABLE): 41.3 ML/MIN/1.73 M^2
ESTIMATED AVG GLUCOSE: 240 MG/DL (ref 68–131)
FERRITIN SERPL-MCNC: 258 NG/ML (ref 20–300)
GLUCOSE SERPL-MCNC: 227 MG/DL (ref 70–110)
GLUCOSE SERPL-MCNC: 227 MG/DL (ref 70–110)
GLUCOSE UR QL STRIP: ABNORMAL
HBA1C MFR BLD: 10 % (ref 4–5.6)
HCT VFR BLD AUTO: 38.5 % (ref 40–54)
HCT VFR BLD AUTO: 38.5 % (ref 40–54)
HGB BLD-MCNC: 13 G/DL (ref 14–18)
HGB BLD-MCNC: 13 G/DL (ref 14–18)
HGB UR QL STRIP: ABNORMAL
HYALINE CASTS #/AREA URNS LPF: 0 /LPF
IMM GRANULOCYTES # BLD AUTO: 0.1 K/UL (ref 0–0.04)
IMM GRANULOCYTES # BLD AUTO: 0.1 K/UL (ref 0–0.04)
IMM GRANULOCYTES NFR BLD AUTO: 0.5 % (ref 0–0.5)
IMM GRANULOCYTES NFR BLD AUTO: 0.5 % (ref 0–0.5)
INFLUENZA A, MOLECULAR: NEGATIVE
INFLUENZA B, MOLECULAR: NEGATIVE
KETONES UR QL STRIP: NEGATIVE
LACTATE SERPL-SCNC: 10.6 MMOL/L (ref 0.5–2.2)
LACTATE SERPL-SCNC: 2.8 MMOL/L (ref 0.5–2.2)
LACTATE SERPL-SCNC: 3.4 MMOL/L (ref 0.5–2.2)
LEUKOCYTE ESTERASE UR QL STRIP: NEGATIVE
LYMPHOCYTES # BLD AUTO: 1.2 K/UL (ref 1–4.8)
LYMPHOCYTES # BLD AUTO: 1.2 K/UL (ref 1–4.8)
LYMPHOCYTES NFR BLD: 6.3 % (ref 18–48)
LYMPHOCYTES NFR BLD: 6.3 % (ref 18–48)
MCH RBC QN AUTO: 29.8 PG (ref 27–31)
MCH RBC QN AUTO: 29.8 PG (ref 27–31)
MCHC RBC AUTO-ENTMCNC: 33.8 G/DL (ref 32–36)
MCHC RBC AUTO-ENTMCNC: 33.8 G/DL (ref 32–36)
MCV RBC AUTO: 88 FL (ref 82–98)
MCV RBC AUTO: 88 FL (ref 82–98)
MICROSCOPIC COMMENT: NORMAL
MONOCYTES # BLD AUTO: 1.8 K/UL (ref 0.3–1)
MONOCYTES # BLD AUTO: 1.8 K/UL (ref 0.3–1)
MONOCYTES NFR BLD: 9.6 % (ref 4–15)
MONOCYTES NFR BLD: 9.6 % (ref 4–15)
NEUTROPHILS # BLD AUTO: 15.3 K/UL (ref 1.8–7.7)
NEUTROPHILS # BLD AUTO: 15.3 K/UL (ref 1.8–7.7)
NEUTROPHILS NFR BLD: 83.3 % (ref 38–73)
NEUTROPHILS NFR BLD: 83.3 % (ref 38–73)
NITRITE UR QL STRIP: NEGATIVE
NRBC BLD-RTO: 0 /100 WBC
NRBC BLD-RTO: 0 /100 WBC
PH UR STRIP: 6 [PH] (ref 5–8)
PLATELET # BLD AUTO: 224 K/UL (ref 150–450)
PLATELET # BLD AUTO: 224 K/UL (ref 150–450)
PMV BLD AUTO: 9.9 FL (ref 9.2–12.9)
PMV BLD AUTO: 9.9 FL (ref 9.2–12.9)
POCT GLUCOSE: 210 MG/DL (ref 70–110)
POCT GLUCOSE: 316 MG/DL (ref 70–110)
POTASSIUM SERPL-SCNC: 4.9 MMOL/L (ref 3.5–5.1)
POTASSIUM SERPL-SCNC: 4.9 MMOL/L (ref 3.5–5.1)
PROCALCITONIN SERPL IA-MCNC: 0.69 NG/ML
PROT SERPL-MCNC: 7.5 G/DL (ref 6–8.4)
PROT SERPL-MCNC: 7.5 G/DL (ref 6–8.4)
PROT UR QL STRIP: ABNORMAL
RBC # BLD AUTO: 4.36 M/UL (ref 4.6–6.2)
RBC # BLD AUTO: 4.36 M/UL (ref 4.6–6.2)
RBC #/AREA URNS HPF: 1 /HPF (ref 0–4)
SARS-COV-2 RDRP RESP QL NAA+PROBE: NEGATIVE
SODIUM SERPL-SCNC: 135 MMOL/L (ref 136–145)
SODIUM SERPL-SCNC: 135 MMOL/L (ref 136–145)
SP GR UR STRIP: >1.03 (ref 1–1.03)
SPECIMEN SOURCE: NORMAL
SQUAMOUS #/AREA URNS HPF: 0 /HPF
TROPONIN I SERPL DL<=0.01 NG/ML-MCNC: 0.02 NG/ML (ref 0–0.03)
URN SPEC COLLECT METH UR: ABNORMAL
UROBILINOGEN UR STRIP-ACNC: NEGATIVE EU/DL
WBC # BLD AUTO: 18.39 K/UL (ref 3.9–12.7)
WBC # BLD AUTO: 18.39 K/UL (ref 3.9–12.7)
WBC #/AREA URNS HPF: 0 /HPF (ref 0–5)
YEAST URNS QL MICRO: NORMAL

## 2022-09-10 PROCEDURE — 87040 BLOOD CULTURE FOR BACTERIA: CPT | Performed by: NURSE PRACTITIONER

## 2022-09-10 PROCEDURE — 25000003 PHARM REV CODE 250: Performed by: STUDENT IN AN ORGANIZED HEALTH CARE EDUCATION/TRAINING PROGRAM

## 2022-09-10 PROCEDURE — 87502 INFLUENZA DNA AMP PROBE: CPT

## 2022-09-10 PROCEDURE — 63600175 PHARM REV CODE 636 W HCPCS: Performed by: STUDENT IN AN ORGANIZED HEALTH CARE EDUCATION/TRAINING PROGRAM

## 2022-09-10 PROCEDURE — 99223 1ST HOSP IP/OBS HIGH 75: CPT | Mod: ,,, | Performed by: INTERNAL MEDICINE

## 2022-09-10 PROCEDURE — 84484 ASSAY OF TROPONIN QUANT: CPT | Performed by: STUDENT IN AN ORGANIZED HEALTH CARE EDUCATION/TRAINING PROGRAM

## 2022-09-10 PROCEDURE — C9113 INJ PANTOPRAZOLE SODIUM, VIA: HCPCS | Performed by: STUDENT IN AN ORGANIZED HEALTH CARE EDUCATION/TRAINING PROGRAM

## 2022-09-10 PROCEDURE — 83605 ASSAY OF LACTIC ACID: CPT | Mod: 91 | Performed by: STUDENT IN AN ORGANIZED HEALTH CARE EDUCATION/TRAINING PROGRAM

## 2022-09-10 PROCEDURE — 25500020 PHARM REV CODE 255: Performed by: NURSE PRACTITIONER

## 2022-09-10 PROCEDURE — 80053 COMPREHEN METABOLIC PANEL: CPT | Performed by: NURSE PRACTITIONER

## 2022-09-10 PROCEDURE — 81000 URINALYSIS NONAUTO W/SCOPE: CPT | Performed by: NURSE PRACTITIONER

## 2022-09-10 PROCEDURE — 87147 CULTURE TYPE IMMUNOLOGIC: CPT | Performed by: NURSE PRACTITIONER

## 2022-09-10 PROCEDURE — 96375 TX/PRO/DX INJ NEW DRUG ADDON: CPT

## 2022-09-10 PROCEDURE — 99223 PR INITIAL HOSPITAL CARE,LEVL III: ICD-10-PCS | Mod: ,,, | Performed by: INTERNAL MEDICINE

## 2022-09-10 PROCEDURE — 25000003 PHARM REV CODE 250: Performed by: INTERNAL MEDICINE

## 2022-09-10 PROCEDURE — S0073 INJECTION, AZTREONAM, 500 MG: HCPCS | Performed by: STUDENT IN AN ORGANIZED HEALTH CARE EDUCATION/TRAINING PROGRAM

## 2022-09-10 PROCEDURE — 84145 PROCALCITONIN (PCT): CPT | Performed by: STUDENT IN AN ORGANIZED HEALTH CARE EDUCATION/TRAINING PROGRAM

## 2022-09-10 PROCEDURE — 83036 HEMOGLOBIN GLYCOSYLATED A1C: CPT | Performed by: STUDENT IN AN ORGANIZED HEALTH CARE EDUCATION/TRAINING PROGRAM

## 2022-09-10 PROCEDURE — S0030 INJECTION, METRONIDAZOLE: HCPCS | Performed by: STUDENT IN AN ORGANIZED HEALTH CARE EDUCATION/TRAINING PROGRAM

## 2022-09-10 PROCEDURE — 82728 ASSAY OF FERRITIN: CPT | Performed by: STUDENT IN AN ORGANIZED HEALTH CARE EDUCATION/TRAINING PROGRAM

## 2022-09-10 PROCEDURE — 93010 ELECTROCARDIOGRAM REPORT: CPT | Mod: ,,, | Performed by: INTERNAL MEDICINE

## 2022-09-10 PROCEDURE — 96366 THER/PROPH/DIAG IV INF ADDON: CPT

## 2022-09-10 PROCEDURE — 96368 THER/DIAG CONCURRENT INF: CPT

## 2022-09-10 PROCEDURE — C9113 INJ PANTOPRAZOLE SODIUM, VIA: HCPCS | Performed by: NURSE PRACTITIONER

## 2022-09-10 PROCEDURE — 93005 ELECTROCARDIOGRAM TRACING: CPT

## 2022-09-10 PROCEDURE — 82962 GLUCOSE BLOOD TEST: CPT

## 2022-09-10 PROCEDURE — 25000003 PHARM REV CODE 250: Performed by: NURSE PRACTITIONER

## 2022-09-10 PROCEDURE — 99291 CRITICAL CARE FIRST HOUR: CPT | Mod: 25

## 2022-09-10 PROCEDURE — 87077 CULTURE AEROBIC IDENTIFY: CPT | Performed by: NURSE PRACTITIONER

## 2022-09-10 PROCEDURE — 63600175 PHARM REV CODE 636 W HCPCS: Performed by: NURSE PRACTITIONER

## 2022-09-10 PROCEDURE — 96365 THER/PROPH/DIAG IV INF INIT: CPT

## 2022-09-10 PROCEDURE — 93010 EKG 12-LEAD: ICD-10-PCS | Mod: ,,, | Performed by: INTERNAL MEDICINE

## 2022-09-10 PROCEDURE — 96361 HYDRATE IV INFUSION ADD-ON: CPT

## 2022-09-10 PROCEDURE — 85025 COMPLETE CBC W/AUTO DIFF WBC: CPT | Performed by: NURSE PRACTITIONER

## 2022-09-10 PROCEDURE — U0002 COVID-19 LAB TEST NON-CDC: HCPCS | Performed by: NURSE PRACTITIONER

## 2022-09-10 PROCEDURE — 11000001 HC ACUTE MED/SURG PRIVATE ROOM

## 2022-09-10 PROCEDURE — 63600175 PHARM REV CODE 636 W HCPCS: Performed by: INTERNAL MEDICINE

## 2022-09-10 PROCEDURE — 83605 ASSAY OF LACTIC ACID: CPT | Mod: 91 | Performed by: NURSE PRACTITIONER

## 2022-09-10 PROCEDURE — 36415 COLL VENOUS BLD VENIPUNCTURE: CPT

## 2022-09-10 PROCEDURE — 87186 SC STD MICRODIL/AGAR DIL: CPT | Mod: 59 | Performed by: NURSE PRACTITIONER

## 2022-09-10 PROCEDURE — 86901 BLOOD TYPING SEROLOGIC RH(D): CPT | Performed by: NURSE PRACTITIONER

## 2022-09-10 PROCEDURE — 83605 ASSAY OF LACTIC ACID: CPT | Mod: 91

## 2022-09-10 PROCEDURE — 87502 INFLUENZA DNA AMP PROBE: CPT | Performed by: NURSE PRACTITIONER

## 2022-09-10 PROCEDURE — 84466 ASSAY OF TRANSFERRIN: CPT | Performed by: STUDENT IN AN ORGANIZED HEALTH CARE EDUCATION/TRAINING PROGRAM

## 2022-09-10 PROCEDURE — 36415 COLL VENOUS BLD VENIPUNCTURE: CPT | Performed by: STUDENT IN AN ORGANIZED HEALTH CARE EDUCATION/TRAINING PROGRAM

## 2022-09-10 RX ORDER — IBUPROFEN 200 MG
24 TABLET ORAL
Status: DISCONTINUED | OUTPATIENT
Start: 2022-09-10 | End: 2022-09-15 | Stop reason: HOSPADM

## 2022-09-10 RX ORDER — TAMSULOSIN HYDROCHLORIDE 0.4 MG/1
0.4 CAPSULE ORAL DAILY
Status: DISCONTINUED | OUTPATIENT
Start: 2022-09-11 | End: 2022-09-15 | Stop reason: HOSPADM

## 2022-09-10 RX ORDER — INSULIN GLARGINE 300 U/ML
66 INJECTION, SOLUTION SUBCUTANEOUS NIGHTLY
COMMUNITY

## 2022-09-10 RX ORDER — TAMSULOSIN HYDROCHLORIDE 0.4 MG/1
CAPSULE ORAL DAILY
Status: ON HOLD | COMMUNITY
End: 2022-09-10

## 2022-09-10 RX ORDER — MULTIVITAMIN
1 TABLET ORAL DAILY
COMMUNITY

## 2022-09-10 RX ORDER — ACETAMINOPHEN 650 MG/1
650 SUPPOSITORY RECTAL
Status: DISCONTINUED | OUTPATIENT
Start: 2022-09-10 | End: 2022-09-10

## 2022-09-10 RX ORDER — INSULIN ASPART 100 [IU]/ML
INJECTION, SOLUTION INTRAVENOUS; SUBCUTANEOUS
COMMUNITY
Start: 2022-08-16

## 2022-09-10 RX ORDER — GLUCAGON 1 MG
1 KIT INJECTION
Status: DISCONTINUED | OUTPATIENT
Start: 2022-09-10 | End: 2022-09-15 | Stop reason: HOSPADM

## 2022-09-10 RX ORDER — AMLODIPINE BESYLATE 10 MG/1
10 TABLET ORAL DAILY
COMMUNITY
Start: 2022-08-16

## 2022-09-10 RX ORDER — ACETAMINOPHEN 325 MG/1
650 TABLET ORAL EVERY 8 HOURS PRN
Status: DISCONTINUED | OUTPATIENT
Start: 2022-09-10 | End: 2022-09-15 | Stop reason: HOSPADM

## 2022-09-10 RX ORDER — SODIUM CHLORIDE 0.9 % (FLUSH) 0.9 %
10 SYRINGE (ML) INJECTION EVERY 12 HOURS PRN
Status: DISCONTINUED | OUTPATIENT
Start: 2022-09-10 | End: 2022-09-15 | Stop reason: HOSPADM

## 2022-09-10 RX ORDER — MONTELUKAST SODIUM 10 MG/1
10 TABLET ORAL DAILY
Status: DISCONTINUED | OUTPATIENT
Start: 2022-09-11 | End: 2022-09-11

## 2022-09-10 RX ORDER — METRONIDAZOLE 500 MG/100ML
500 INJECTION, SOLUTION INTRAVENOUS
Status: DISCONTINUED | OUTPATIENT
Start: 2022-09-10 | End: 2022-09-11

## 2022-09-10 RX ORDER — NALOXONE HCL 0.4 MG/ML
0.02 VIAL (ML) INJECTION
Status: DISCONTINUED | OUTPATIENT
Start: 2022-09-10 | End: 2022-09-15 | Stop reason: HOSPADM

## 2022-09-10 RX ORDER — FLUTICASONE FUROATE AND VILANTEROL 100; 25 UG/1; UG/1
1 POWDER RESPIRATORY (INHALATION) DAILY
Status: DISCONTINUED | OUTPATIENT
Start: 2022-09-11 | End: 2022-09-15 | Stop reason: HOSPADM

## 2022-09-10 RX ORDER — ACETAMINOPHEN 10 MG/ML
1000 INJECTION, SOLUTION INTRAVENOUS ONCE
Status: COMPLETED | OUTPATIENT
Start: 2022-09-10 | End: 2022-09-10

## 2022-09-10 RX ORDER — ONDANSETRON 2 MG/ML
4 INJECTION INTRAMUSCULAR; INTRAVENOUS
Status: COMPLETED | OUTPATIENT
Start: 2022-09-10 | End: 2022-09-10

## 2022-09-10 RX ORDER — LISINOPRIL 10 MG/1
20 TABLET ORAL 2 TIMES DAILY
Status: DISCONTINUED | OUTPATIENT
Start: 2022-09-10 | End: 2022-09-10

## 2022-09-10 RX ORDER — SODIUM CHLORIDE, SODIUM LACTATE, POTASSIUM CHLORIDE, CALCIUM CHLORIDE 600; 310; 30; 20 MG/100ML; MG/100ML; MG/100ML; MG/100ML
250 INJECTION, SOLUTION INTRAVENOUS CONTINUOUS
Status: ACTIVE | OUTPATIENT
Start: 2022-09-10 | End: 2022-09-10

## 2022-09-10 RX ORDER — INSULIN ASPART 100 [IU]/ML
0-5 INJECTION, SOLUTION INTRAVENOUS; SUBCUTANEOUS
Status: DISCONTINUED | OUTPATIENT
Start: 2022-09-10 | End: 2022-09-13

## 2022-09-10 RX ORDER — PANTOPRAZOLE SODIUM 40 MG/10ML
80 INJECTION, POWDER, LYOPHILIZED, FOR SOLUTION INTRAVENOUS
Status: COMPLETED | OUTPATIENT
Start: 2022-09-10 | End: 2022-09-10

## 2022-09-10 RX ORDER — PANTOPRAZOLE SODIUM 40 MG/10ML
40 INJECTION, POWDER, LYOPHILIZED, FOR SOLUTION INTRAVENOUS 2 TIMES DAILY
Status: DISCONTINUED | OUTPATIENT
Start: 2022-09-10 | End: 2022-09-13

## 2022-09-10 RX ORDER — ONDANSETRON 8 MG/1
8 TABLET, ORALLY DISINTEGRATING ORAL EVERY 8 HOURS PRN
Status: DISCONTINUED | OUTPATIENT
Start: 2022-09-10 | End: 2022-09-15 | Stop reason: HOSPADM

## 2022-09-10 RX ORDER — ATORVASTATIN CALCIUM 10 MG/1
10 TABLET, FILM COATED ORAL DAILY
Status: DISCONTINUED | OUTPATIENT
Start: 2022-09-11 | End: 2022-09-15 | Stop reason: HOSPADM

## 2022-09-10 RX ORDER — IBUPROFEN 200 MG
16 TABLET ORAL
Status: DISCONTINUED | OUTPATIENT
Start: 2022-09-10 | End: 2022-09-15 | Stop reason: HOSPADM

## 2022-09-10 RX ADMIN — IOHEXOL 130 ML: 350 INJECTION, SOLUTION INTRAVENOUS at 04:09

## 2022-09-10 RX ADMIN — ACETAMINOPHEN 1000 MG: 10 INJECTION INTRAVENOUS at 05:09

## 2022-09-10 RX ADMIN — BACITRACIN ZINC, NEOMYCIN SULFATE, POLYMYXIN B SULFATE 1 EACH: 3.5; 5000; 4 OINTMENT TOPICAL at 06:09

## 2022-09-10 RX ADMIN — SODIUM CHLORIDE 1000 ML: 0.9 INJECTION, SOLUTION INTRAVENOUS at 03:09

## 2022-09-10 RX ADMIN — PANTOPRAZOLE SODIUM 80 MG: 40 INJECTION, POWDER, LYOPHILIZED, FOR SOLUTION INTRAVENOUS at 01:09

## 2022-09-10 RX ADMIN — SODIUM CHLORIDE, SODIUM LACTATE, POTASSIUM CHLORIDE, AND CALCIUM CHLORIDE 1000 ML: .6; .31; .03; .02 INJECTION, SOLUTION INTRAVENOUS at 04:09

## 2022-09-10 RX ADMIN — PANTOPRAZOLE SODIUM 40 MG: 40 INJECTION, POWDER, FOR SOLUTION INTRAVENOUS at 09:09

## 2022-09-10 RX ADMIN — METRONIDAZOLE 500 MG: 5 INJECTION, SOLUTION INTRAVENOUS at 09:09

## 2022-09-10 RX ADMIN — AZTREONAM 2000 MG: 2 INJECTION, POWDER, LYOPHILIZED, FOR SOLUTION INTRAMUSCULAR; INTRAVENOUS at 09:09

## 2022-09-10 RX ADMIN — VANCOMYCIN HYDROCHLORIDE 2000 MG: 500 INJECTION, POWDER, LYOPHILIZED, FOR SOLUTION INTRAVENOUS at 05:09

## 2022-09-10 RX ADMIN — SODIUM CHLORIDE, SODIUM LACTATE, POTASSIUM CHLORIDE, AND CALCIUM CHLORIDE 250 ML: .6; .31; .03; .02 INJECTION, SOLUTION INTRAVENOUS at 08:09

## 2022-09-10 RX ADMIN — ONDANSETRON 4 MG: 2 INJECTION INTRAMUSCULAR; INTRAVENOUS at 01:09
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
## 2022-09-10 NOTE — HPI
67-year-old gentleman past medical history significant for diabetes and asthma who presented to the emergency department with complaints of hematemesis which began earlier this morning.  Patient apparently woke feeling well, however around 9:00 a.m. he began experiencing significant nausea followed by nonbloody/nonbilious vomiting.  This progressed to some coffee-ground emesis.  Given worsening patient presents emergency department for further evaluation.

## 2022-09-10 NOTE — ED NOTES
Called and informed 4th floor where 454 is located that if nightshift nurse could call back for report it would be appreciated  reported she would secure chat nurse.

## 2022-09-10 NOTE — ASSESSMENT & PLAN NOTE
Likely 2/2 Apryl-Dixon tear preceded by retching.  Does report prior history of gastric ulcer, however denies significant NSAID use.  No hemoglobin baseline on file, however most recent value relatively normal.  Trend   Continue PPI   Okay to have liquid diet  Consider EGD if bleeding persists or hemoglobin continues to fall

## 2022-09-11 PROBLEM — L03.115 CELLULITIS OF RIGHT LOWER EXTREMITY: Status: ACTIVE | Noted: 2022-09-11

## 2022-09-11 PROBLEM — R78.81 BACTEREMIA: Status: ACTIVE | Noted: 2022-09-11

## 2022-09-11 LAB
ALBUMIN SERPL BCP-MCNC: 3 G/DL (ref 3.5–5.2)
ALP SERPL-CCNC: 61 U/L (ref 55–135)
ALT SERPL W/O P-5'-P-CCNC: 24 U/L (ref 10–44)
ANION GAP SERPL CALC-SCNC: 10 MMOL/L (ref 8–16)
AST SERPL-CCNC: 18 U/L (ref 10–40)
BASOPHILS # BLD AUTO: 0.03 K/UL (ref 0–0.2)
BASOPHILS NFR BLD: 0.2 % (ref 0–1.9)
BILIRUB SERPL-MCNC: 0.5 MG/DL (ref 0.1–1)
BUN SERPL-MCNC: 24 MG/DL (ref 8–23)
CALCIUM SERPL-MCNC: 8.4 MG/DL (ref 8.7–10.5)
CHLORIDE SERPL-SCNC: 103 MMOL/L (ref 95–110)
CHOLEST SERPL-MCNC: 83 MG/DL (ref 120–199)
CHOLEST/HDLC SERPL: 2.5 {RATIO} (ref 2–5)
CO2 SERPL-SCNC: 21 MMOL/L (ref 23–29)
CREAT SERPL-MCNC: 1.7 MG/DL (ref 0.5–1.4)
DIFFERENTIAL METHOD: ABNORMAL
EOSINOPHIL # BLD AUTO: 0 K/UL (ref 0–0.5)
EOSINOPHIL NFR BLD: 0.2 % (ref 0–8)
ERYTHROCYTE [DISTWIDTH] IN BLOOD BY AUTOMATED COUNT: 12.7 % (ref 11.5–14.5)
EST. GFR  (NO RACE VARIABLE): 44 ML/MIN/1.73 M^2
GLUCOSE SERPL-MCNC: 204 MG/DL (ref 70–110)
HCT VFR BLD AUTO: 35 % (ref 40–54)
HDLC SERPL-MCNC: 33 MG/DL (ref 40–75)
HDLC SERPL: 39.8 % (ref 20–50)
HGB BLD-MCNC: 11.6 G/DL (ref 14–18)
IMM GRANULOCYTES # BLD AUTO: 0.05 K/UL (ref 0–0.04)
IMM GRANULOCYTES NFR BLD AUTO: 0.4 % (ref 0–0.5)
IRON SERPL-MCNC: 10 UG/DL (ref 45–160)
LACTATE SERPL-SCNC: 1.7 MMOL/L (ref 0.5–2.2)
LDLC SERPL CALC-MCNC: 37.8 MG/DL (ref 63–159)
LYMPHOCYTES # BLD AUTO: 1.2 K/UL (ref 1–4.8)
LYMPHOCYTES NFR BLD: 8.8 % (ref 18–48)
MAGNESIUM SERPL-MCNC: 1.1 MG/DL (ref 1.6–2.6)
MCH RBC QN AUTO: 29.6 PG (ref 27–31)
MCHC RBC AUTO-ENTMCNC: 33.1 G/DL (ref 32–36)
MCV RBC AUTO: 89 FL (ref 82–98)
MONOCYTES # BLD AUTO: 1.1 K/UL (ref 0.3–1)
MONOCYTES NFR BLD: 8.4 % (ref 4–15)
MRSA ID BY PCR: NEGATIVE
NEUTROPHILS # BLD AUTO: 10.8 K/UL (ref 1.8–7.7)
NEUTROPHILS NFR BLD: 82 % (ref 38–73)
NONHDLC SERPL-MCNC: 50 MG/DL
NRBC BLD-RTO: 0 /100 WBC
PHOSPHATE SERPL-MCNC: 2.8 MG/DL (ref 2.7–4.5)
PLATELET # BLD AUTO: 187 K/UL (ref 150–450)
PMV BLD AUTO: 10 FL (ref 9.2–12.9)
POCT GLUCOSE: 171 MG/DL (ref 70–110)
POCT GLUCOSE: 214 MG/DL (ref 70–110)
POCT GLUCOSE: 240 MG/DL (ref 70–110)
POCT GLUCOSE: 246 MG/DL (ref 70–110)
POTASSIUM SERPL-SCNC: 4.6 MMOL/L (ref 3.5–5.1)
PROT SERPL-MCNC: 6.3 G/DL (ref 6–8.4)
RBC # BLD AUTO: 3.92 M/UL (ref 4.6–6.2)
SATURATED IRON: 5 % (ref 20–50)
SODIUM SERPL-SCNC: 134 MMOL/L (ref 136–145)
STAPH AUREUS ID BY PCR: NEGATIVE
TOTAL IRON BINDING CAPACITY: 218 UG/DL (ref 250–450)
TRANSFERRIN SERPL-MCNC: 147 MG/DL (ref 200–375)
TRIGL SERPL-MCNC: 61 MG/DL (ref 30–150)
TROPONIN I SERPL DL<=0.01 NG/ML-MCNC: 0.02 NG/ML (ref 0–0.03)
VANCOMYCIN SERPL-MCNC: 13.8 UG/ML
VANCOMYCIN SERPL-MCNC: 18.5 UG/ML
WBC # BLD AUTO: 13.13 K/UL (ref 3.9–12.7)

## 2022-09-11 PROCEDURE — C9113 INJ PANTOPRAZOLE SODIUM, VIA: HCPCS | Performed by: STUDENT IN AN ORGANIZED HEALTH CARE EDUCATION/TRAINING PROGRAM

## 2022-09-11 PROCEDURE — 84100 ASSAY OF PHOSPHORUS: CPT | Performed by: STUDENT IN AN ORGANIZED HEALTH CARE EDUCATION/TRAINING PROGRAM

## 2022-09-11 PROCEDURE — 63600175 PHARM REV CODE 636 W HCPCS: Performed by: STUDENT IN AN ORGANIZED HEALTH CARE EDUCATION/TRAINING PROGRAM

## 2022-09-11 PROCEDURE — 84484 ASSAY OF TROPONIN QUANT: CPT | Performed by: STUDENT IN AN ORGANIZED HEALTH CARE EDUCATION/TRAINING PROGRAM

## 2022-09-11 PROCEDURE — 87150 DNA/RNA AMPLIFIED PROBE: CPT | Performed by: NURSE PRACTITIONER

## 2022-09-11 PROCEDURE — 94640 AIRWAY INHALATION TREATMENT: CPT

## 2022-09-11 PROCEDURE — 80202 ASSAY OF VANCOMYCIN: CPT | Mod: 91 | Performed by: INTERNAL MEDICINE

## 2022-09-11 PROCEDURE — 11000001 HC ACUTE MED/SURG PRIVATE ROOM

## 2022-09-11 PROCEDURE — 36415 COLL VENOUS BLD VENIPUNCTURE: CPT | Performed by: STUDENT IN AN ORGANIZED HEALTH CARE EDUCATION/TRAINING PROGRAM

## 2022-09-11 PROCEDURE — 99233 PR SUBSEQUENT HOSPITAL CARE,LEVL III: ICD-10-PCS | Mod: ,,, | Performed by: INTERNAL MEDICINE

## 2022-09-11 PROCEDURE — 85025 COMPLETE CBC W/AUTO DIFF WBC: CPT | Performed by: STUDENT IN AN ORGANIZED HEALTH CARE EDUCATION/TRAINING PROGRAM

## 2022-09-11 PROCEDURE — 80061 LIPID PANEL: CPT | Performed by: STUDENT IN AN ORGANIZED HEALTH CARE EDUCATION/TRAINING PROGRAM

## 2022-09-11 PROCEDURE — 87040 BLOOD CULTURE FOR BACTERIA: CPT | Mod: 59 | Performed by: STUDENT IN AN ORGANIZED HEALTH CARE EDUCATION/TRAINING PROGRAM

## 2022-09-11 PROCEDURE — 99233 SBSQ HOSP IP/OBS HIGH 50: CPT | Mod: ,,, | Performed by: INTERNAL MEDICINE

## 2022-09-11 PROCEDURE — 63600175 PHARM REV CODE 636 W HCPCS: Performed by: INTERNAL MEDICINE

## 2022-09-11 PROCEDURE — 27000221 HC OXYGEN, UP TO 24 HOURS

## 2022-09-11 PROCEDURE — S0073 INJECTION, AZTREONAM, 500 MG: HCPCS | Performed by: STUDENT IN AN ORGANIZED HEALTH CARE EDUCATION/TRAINING PROGRAM

## 2022-09-11 PROCEDURE — 25000003 PHARM REV CODE 250: Performed by: STUDENT IN AN ORGANIZED HEALTH CARE EDUCATION/TRAINING PROGRAM

## 2022-09-11 PROCEDURE — 83735 ASSAY OF MAGNESIUM: CPT | Performed by: STUDENT IN AN ORGANIZED HEALTH CARE EDUCATION/TRAINING PROGRAM

## 2022-09-11 PROCEDURE — S0030 INJECTION, METRONIDAZOLE: HCPCS | Performed by: STUDENT IN AN ORGANIZED HEALTH CARE EDUCATION/TRAINING PROGRAM

## 2022-09-11 PROCEDURE — 99900035 HC TECH TIME PER 15 MIN (STAT)

## 2022-09-11 PROCEDURE — 80053 COMPREHEN METABOLIC PANEL: CPT | Performed by: STUDENT IN AN ORGANIZED HEALTH CARE EDUCATION/TRAINING PROGRAM

## 2022-09-11 PROCEDURE — 25000242 PHARM REV CODE 250 ALT 637 W/ HCPCS: Performed by: STUDENT IN AN ORGANIZED HEALTH CARE EDUCATION/TRAINING PROGRAM

## 2022-09-11 PROCEDURE — 25000003 PHARM REV CODE 250: Performed by: INTERNAL MEDICINE

## 2022-09-11 PROCEDURE — 94761 N-INVAS EAR/PLS OXIMETRY MLT: CPT

## 2022-09-11 PROCEDURE — 36415 COLL VENOUS BLD VENIPUNCTURE: CPT | Performed by: INTERNAL MEDICINE

## 2022-09-11 RX ORDER — MONTELUKAST SODIUM 10 MG/1
10 TABLET ORAL NIGHTLY
Status: DISCONTINUED | OUTPATIENT
Start: 2022-09-11 | End: 2022-09-15 | Stop reason: HOSPADM

## 2022-09-11 RX ADMIN — MONTELUKAST 10 MG: 10 TABLET, FILM COATED ORAL at 09:09

## 2022-09-11 RX ADMIN — ATORVASTATIN CALCIUM 10 MG: 10 TABLET, FILM COATED ORAL at 08:09

## 2022-09-11 RX ADMIN — VANCOMYCIN HYDROCHLORIDE 2000 MG: 500 INJECTION, POWDER, LYOPHILIZED, FOR SOLUTION INTRAVENOUS at 07:09

## 2022-09-11 RX ADMIN — METRONIDAZOLE 500 MG: 5 INJECTION, SOLUTION INTRAVENOUS at 05:09

## 2022-09-11 RX ADMIN — INSULIN DETEMIR 10 UNITS: 100 INJECTION, SOLUTION SUBCUTANEOUS at 09:09

## 2022-09-11 RX ADMIN — INSULIN ASPART 1 UNITS: 100 INJECTION, SOLUTION INTRAVENOUS; SUBCUTANEOUS at 09:09

## 2022-09-11 RX ADMIN — TAMSULOSIN HYDROCHLORIDE 0.4 MG: 0.4 CAPSULE ORAL at 08:09

## 2022-09-11 RX ADMIN — AZTREONAM 2000 MG: 2 INJECTION, POWDER, LYOPHILIZED, FOR SOLUTION INTRAMUSCULAR; INTRAVENOUS at 05:09

## 2022-09-11 RX ADMIN — VANCOMYCIN HYDROCHLORIDE 1250 MG: 1.25 INJECTION, POWDER, LYOPHILIZED, FOR SOLUTION INTRAVENOUS at 09:09

## 2022-09-11 RX ADMIN — INSULIN ASPART 2 UNITS: 100 INJECTION, SOLUTION INTRAVENOUS; SUBCUTANEOUS at 06:09

## 2022-09-11 RX ADMIN — PANTOPRAZOLE SODIUM 40 MG: 40 INJECTION, POWDER, FOR SOLUTION INTRAVENOUS at 09:09

## 2022-09-11 RX ADMIN — FLUTICASONE FUROATE AND VILANTEROL TRIFENATATE 1 PUFF: 100; 25 POWDER RESPIRATORY (INHALATION) at 09:09

## 2022-09-11 RX ADMIN — INSULIN ASPART 2 UNITS: 100 INJECTION, SOLUTION INTRAVENOUS; SUBCUTANEOUS at 12:09

## 2022-09-11 RX ADMIN — PANTOPRAZOLE SODIUM 40 MG: 40 INJECTION, POWDER, FOR SOLUTION INTRAVENOUS at 08:09
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
## 2022-09-11 NOTE — PLAN OF CARE
Patient on oxygen with documented flow.  Will attempt to wean per O2 order protocol.  The proper method of use, as well as anticipated side effects, of this metered-dose inhaler are discussed and demonstrated to the patient.  Will continue to monitor.

## 2022-09-11 NOTE — PROGRESS NOTES
Med - Telemetry  Gastroenterology  Progress Note    Patient Name: Alfa Ortiz  MRN: [id number]  Admission Date: 9/10/2022  Hospital Length of Stay: 1 days  Code Status: Full Code   Attending Provider: Jeffrey Mcrae MD  Consulting Provider: Mike Lugo MD  Primary Care Physician: Trevon Joyner MD  Principal Problem: Coffee ground emesis      Subjective:     Interval History: Denies further hematemesis over night. Does report melena this AM. Drop in hgb noted    Review of Systems   Constitutional:  Negative for chills, fever and unexpected weight change.   HENT:  Negative for congestion and trouble swallowing.    Eyes:  Negative for photophobia and visual disturbance.   Respiratory:  Negative for cough and shortness of breath.    Cardiovascular:  Negative for chest pain and leg swelling.   Gastrointestinal:  Positive for nausea and vomiting. Negative for abdominal distention, abdominal pain, blood in stool, constipation and diarrhea.   Genitourinary:  Negative for dysuria and hematuria.   Musculoskeletal:  Negative for arthralgias and myalgias.   Skin:  Negative for color change and rash.   Neurological:  Negative for dizziness, light-headedness and numbness.   Psychiatric/Behavioral:  Negative for agitation and confusion.    Objective:     Vital Signs (Most Recent):  Temp: 99 °F (37.2 °C) (09/11/22 1709)  Pulse: 100 (09/11/22 1709)  Resp: 18 (09/11/22 1709)  BP: (!) 143/66 (09/11/22 1709)  SpO2: 95 % (09/11/22 1709)   Vital Signs (24h Range):  Temp:  [97.6 °F (36.4 °C)-100.6 °F (38.1 °C)] 99 °F (37.2 °C)  Pulse:  [] 100  Resp:  [16-22] 18  SpO2:  [94 %-99 %] 95 %  BP: (117-166)/(63-77) 143/66     Weight: (!) 145.3 kg (320 lb 5.3 oz) (09/10/22 2010)  Body mass index is 44.68 kg/m².    No intake or output data in the 24 hours ending 09/11/22 1821    Lines/Drains/Airways       Peripheral Intravenous Line  Duration                  Peripheral IV - Single Lumen 09/10/22 1321 20 G Anterior;Left  Forearm 1 day                    Physical Exam  Vitals and nursing note reviewed.   Constitutional:       Appearance: He is well-developed. He is obese.   HENT:      Head: Normocephalic and atraumatic.   Eyes:      General: No scleral icterus.     Pupils: Pupils are equal, round, and reactive to light.   Cardiovascular:      Rate and Rhythm: Normal rate and regular rhythm.      Heart sounds: Normal heart sounds.   Pulmonary:      Effort: Pulmonary effort is normal. No respiratory distress.      Breath sounds: Normal breath sounds.   Abdominal:      General: Bowel sounds are normal. There is no distension.      Palpations: Abdomen is soft.      Tenderness: There is no abdominal tenderness.   Musculoskeletal:         General: Normal range of motion.      Cervical back: Normal range of motion and neck supple.   Skin:     General: Skin is warm and dry.      Findings: No erythema or rash.   Neurological:      Mental Status: He is alert and oriented to person, place, and time.      Comments: No asterixis   Psychiatric:         Behavior: Behavior normal.       Significant Labs:  Recent Lab Results  (Last 5 results in the past 24 hours)        09/11/22  1707   09/11/22  1150   09/11/22  0548   09/11/22  0210   09/11/22  0155        Albumin         3.0       Alkaline Phosphatase         61       ALT         24       Anion Gap         10       AST         18       BILIRUBIN TOTAL         0.5  Comment: For infants and newborns, interpretation of results should be based  on gestational age, weight and in agreement with clinical  observations.    Premature Infant recommended reference ranges:  Up to 24 hours.............<8.0 mg/dL  Up to 48 hours............<12.0 mg/dL  3-5 days..................<15.0 mg/dL  6-29 days.................<15.0 mg/dL         BUN         24       Calcium         8.4       Chloride         103       Cholesterol         83  Comment: The National Cholesterol Education Program (NCEP) has set  the  following guidelines (reference ranges) for Cholesterol:  Optimal.....................<200 mg/dL  Borderline High.............200-239 mg/dL  High........................> or = 240 mg/dL         CO2         21       Creatinine         1.7       eGFR         44       Glucose         204       HDL         33  Comment: The National Cholesterol Education Program (NCEP) has set the  following guidelines (reference values) for HDL Cholesterol:  Low...............<40 mg/dL  Optimal...........>60 mg/dL         HDL/Cholesterol Ratio         39.8       LDL Cholesterol External         37.8  Comment: The National Cholesterol Education Program (NCEP) has set the  following guidelines (reference values) for LDL Cholesterol:  Optimal.......................<130 mg/dL  Borderline High...............130-159 mg/dL  High..........................160-189 mg/dL  Very High.....................>190 mg/dL         Magnesium         1.1       MRSA ID by PCR       Negative         Non-HDL Cholesterol         50  Comment: Risk category and Non-HDL cholesterol goals:  Coronary heart disease (CHD)or equivalent (10-year risk of CHD >20%):  Non-HDL cholesterol goal     <130 mg/dL  Two or more CHD risk factors and 10-year risk of CHD <= 20%:  Non-HDL cholesterol goal     <160 mg/dL  0 to 1 CHD risk factor:  Non-HDL cholesterol goal     <190 mg/dL         Phosphorus         2.8       POCT Glucose 246   240   171           Potassium         4.6       PROTEIN TOTAL         6.3       Sodium         134       Staph aureus ID by PCR       Negative         Total Cholesterol/HDL Ratio         2.5       Triglycerides         61  Comment: The National Cholesterol Education Program (NCEP) has set the  following guidelines (reference values) for triglycerides:  Normal......................<150 mg/dL  Borderline High.............150-199 mg/dL  High........................200-499 mg/dL         Troponin I         0.017  Comment: The reference interval for  Troponin I represents the 99th percentile   cutoff   for our facility and is consistent with 3rd generation assay   performance.         Vancomycin, Random         13.8                                Significant Imaging:  Imaging results within the past 24 hours have been reviewed.    Assessment/Plan:     * Coffee ground emesis  Likely 2/2 Apryl-Dixon tear preceded by retching.  Does report prior history of gastric ulcer, however denies significant NSAID use.  Hgb dropped overnight. Will plan EGD tomorrow  Continue PPI   NPO after midnight  Trend Hgb        Thank you for your consult. I will follow-up with patient. Please contact us if you have any additional questions.    Mike Lugo MD  Gastroenterology  [facility]

## 2022-09-11 NOTE — PROGRESS NOTES
[facility] Medicine Progress Note    Primary Team: [facility] Hospitalist Team Team B  Attending Physician: Jeffrey Mcrae MD  Resident: Leidy  Intern: Colt    Subjective:      Pt reports doing better this morning - no nausea or vomiting since yesterday. Doing well with Ab regimen - denies any new symptoms including CP, SOB.       Objective:     Last 24 Hour Vital Signs:  BP  Min: 117/64  Max: 166/76  Temp  Av °F (37.2 °C)  Min: 97.6 °F (36.4 °C)  Max: 100.6 °F (38.1 °C)  Pulse  Av.4  Min: 91  Max: 112  Resp  Av.5  Min: 16  Max: 22  SpO2  Av.4 %  Min: 94 %  Max: 99 %  Weight  Av.3 kg (320 lb 5.3 oz)  Min: 145.3 kg (320 lb 5.3 oz)  Max: 145.3 kg (320 lb 5.3 oz)  I/O last 3 completed shifts:  In: 3700 [IV Piggyback:3700]  Out: 450 [Urine:150; Emesis/NG output:300]    Physical Examination:  General Appearance:    Alert, cooperative, no distress, appears stated age   Head:    Normocephalic, without obvious abnormality, atraumatic   Eyes:     conjunctiva/corneas clear, EOMI         Nose:   Nares normal, septum midline, mucosa normal, no drainage    or sinus tenderness   Throat:   Lips, mucosa, and tongue normal; teeth and gums normal   Neck:   Supple, symmetrical, trachea midline, no adenopathy;        thyroid:     Back:     Symmetric, no curvature, ROM normal, no CVA tenderness   Lungs:     Clear to auscultation bilaterally, respirations unlabored   Chest wall:    No tenderness or deformity   Heart:    Regular rate and rhythm, S1 and S2 normal, no murmur, rub   or gallop   Abdomen:     Soft, non-tender, bowel sounds active all four quadrants,     no masses, no organomegaly               Extremities:   Extremities normal, atraumatic, no cyanosis. Mild edema bilaterally    Pulses:   2+ and symmetric all extremities   Skin:   Skin color, texture, turgor normal, no rashes or lesions   Lymph nodes:   Cervical, supraclavicular, and axillary nodes normal   Neurologic:  Awake alert and oriented,  moves all extremities. . Normal strength, sensation and reflexes       throughout       Laboratory:  Laboratory Data Reviewed: yes      Microbiology Data Reviewed: yes      Other Results:  EKG (my interpretation): normal EKG, normal sinus rhythm, unchanged from previous tracings.    Radiology Data Reviewed: yes      Current Medications:     Infusions:       Scheduled:   atorvastatin  10 mg Oral Daily    fluticasone furoate-vilanteroL  1 puff Inhalation Daily    montelukast  10 mg Oral QHS    pantoprazole  40 mg Intravenous BID    tamsulosin  0.4 mg Oral Daily        PRN:  acetaminophen, dextrose 10%, dextrose 10%, glucagon (human recombinant), glucose, glucose, influenza, insulin aspart U-100, naloxone, ondansetron, sodium chloride 0.9%, Pharmacy to dose Vancomycin consult **AND** vancomycin - pharmacy to dose    Antibiotics and Day Number of Therapy:  Vanc     Lines and Day Number of Therapy:  PIV    Assessment:     Alfa Ortiz is a 67 y.o.male with  Patient Active Problem List    Diagnosis Date Noted    Bacteremia 09/11/2022    Cellulitis of right lower extremity 09/11/2022    Coffee ground emesis 09/10/2022    Fever 09/10/2022    Sepsis 09/10/2022    Leukocytosis 09/10/2022    Elevated lactic acid level 09/10/2022    Asthmatic bronchitis with acute exacerbation 04/18/2016    BPH (benign prostatic hypertrophy) 06/22/2015    Panic disorder 05/28/2015    Dysphagia 05/28/2015    Acute UTI 05/28/2015    Hypertension 03/31/2015    Family history of early CAD 03/31/2015    Cellulitis of left leg 09/17/2013    Type 2 diabetes mellitus with diabetic polyneuropathy 08/01/2013    Hyperlipidemia 08/01/2013    Morbid obesity with BMI of 40.0-44.9, adult 08/01/2013    Elevated blood pressure 10/25/2012        Plan:       Sepsis, unknown origin  Bacteremia   Unclear etiology, presented with fever, WBC 18, lactic acid persistently elevated.   Pt w/ reported allergy to cefdinir.   - vitals stable though watching closely  - blood  cultures collected, urine clean, CXR with mild R sided infiltrate and LLL poorly visualized though poor quality film. Getting CT chest.   - flu and covid negative   -MRSA/SA rapid by PCR negative   - blood cultures growing gram positive cocci, unclear source he has no recent surgery or metal. Possibly gut translocation. Getting ID on board.   - procal 0.69  - Continue IV vancomycin, stopped aztreonam and flagyl 9/11     DM  - home meds glimepiride, metformin 1000 BID  - SSI      Hematemesis, resolved  - presented with several episodes of vomiting followed by hematemesis, GI has seen and suspects timothy tnoi tear.   -PPI IV BID, type and screen  - h/h stable, repeat if bleeding.  - zofran PRN though symptoms improved at this time.      Normocytic anemia  - likely from vomiting as above but needs c-scope out pt  - iron pending and ferritin 258.      Asthma  Continue home breo and montelukast     CKD3 with JOSE CARLOS  - baseline Cr 1.4, now 1.8. holding lisinopril and giving fluids     HTN  Home meds: lisinopril, holding for now as above.      Diet: NPO  Code: full  PPx: holding for GIB  Dispo: resolution of sepsis.      Jeanmarie Forbes MD  [facility] Internal Medicine HO-III     [facility] Hospitalist Pager numbers:   [facility] Hospitalist Medicine Team A (Josh/Dennise):          [phone]  [facility] Hospitalist Medicine Team B (Linda/Clementina):        [phone]

## 2022-09-11 NOTE — SUBJECTIVE & OBJECTIVE
Subjective:     Interval History: Denies further hematemesis over night. Does report melena this AM. Drop in hgb noted    Review of Systems   Constitutional:  Negative for chills, fever and unexpected weight change.   HENT:  Negative for congestion and trouble swallowing.    Eyes:  Negative for photophobia and visual disturbance.   Respiratory:  Negative for cough and shortness of breath.    Cardiovascular:  Negative for chest pain and leg swelling.   Gastrointestinal:  Positive for nausea and vomiting. Negative for abdominal distention, abdominal pain, blood in stool, constipation and diarrhea.   Genitourinary:  Negative for dysuria and hematuria.   Musculoskeletal:  Negative for arthralgias and myalgias.   Skin:  Negative for color change and rash.   Neurological:  Negative for dizziness, light-headedness and numbness.   Psychiatric/Behavioral:  Negative for agitation and confusion.    Objective:     Vital Signs (Most Recent):  Temp: 99 °F (37.2 °C) (09/11/22 1709)  Pulse: 100 (09/11/22 1709)  Resp: 18 (09/11/22 1709)  BP: (!) 143/66 (09/11/22 1709)  SpO2: 95 % (09/11/22 1709)   Vital Signs (24h Range):  Temp:  [97.6 °F (36.4 °C)-100.6 °F (38.1 °C)] 99 °F (37.2 °C)  Pulse:  [] 100  Resp:  [16-22] 18  SpO2:  [94 %-99 %] 95 %  BP: (117-166)/(63-77) 143/66     Weight: (!) 145.3 kg (320 lb 5.3 oz) (09/10/22 2010)  Body mass index is 44.68 kg/m².    No intake or output data in the 24 hours ending 09/11/22 1821    Lines/Drains/Airways       Peripheral Intravenous Line  Duration                  Peripheral IV - Single Lumen 09/10/22 1321 20 G Anterior;Left Forearm 1 day                    Physical Exam  Vitals and nursing note reviewed.   Constitutional:       Appearance: He is well-developed. He is obese.   HENT:      Head: Normocephalic and atraumatic.   Eyes:      General: No scleral icterus.     Pupils: Pupils are equal, round, and reactive to light.   Cardiovascular:      Rate and Rhythm: Normal rate and  regular rhythm.      Heart sounds: Normal heart sounds.   Pulmonary:      Effort: Pulmonary effort is normal. No respiratory distress.      Breath sounds: Normal breath sounds.   Abdominal:      General: Bowel sounds are normal. There is no distension.      Palpations: Abdomen is soft.      Tenderness: There is no abdominal tenderness.   Musculoskeletal:         General: Normal range of motion.      Cervical back: Normal range of motion and neck supple.   Skin:     General: Skin is warm and dry.      Findings: No erythema or rash.   Neurological:      Mental Status: He is alert and oriented to person, place, and time.      Comments: No asterixis   Psychiatric:         Behavior: Behavior normal.       Significant Labs:  Recent Lab Results  (Last 5 results in the past 24 hours)        09/11/22  1707   09/11/22  1150   09/11/22  0548   09/11/22  0210   09/11/22  0155        Albumin         3.0       Alkaline Phosphatase         61       ALT         24       Anion Gap         10       AST         18       BILIRUBIN TOTAL         0.5  Comment: For infants and newborns, interpretation of results should be based  on gestational age, weight and in agreement with clinical  observations.    Premature Infant recommended reference ranges:  Up to 24 hours.............<8.0 mg/dL  Up to 48 hours............<12.0 mg/dL  3-5 days..................<15.0 mg/dL  6-29 days.................<15.0 mg/dL         BUN         24       Calcium         8.4       Chloride         103       Cholesterol         83  Comment: The National Cholesterol Education Program (NCEP) has set the  following guidelines (reference ranges) for Cholesterol:  Optimal.....................<200 mg/dL  Borderline High.............200-239 mg/dL  High........................> or = 240 mg/dL         CO2         21       Creatinine         1.7       eGFR         44       Glucose         204       HDL         33  Comment: The National Cholesterol Education Program (NCEP)  has set the  following guidelines (reference values) for HDL Cholesterol:  Low...............<40 mg/dL  Optimal...........>60 mg/dL         HDL/Cholesterol Ratio         39.8       LDL Cholesterol External         37.8  Comment: The National Cholesterol Education Program (NCEP) has set the  following guidelines (reference values) for LDL Cholesterol:  Optimal.......................<130 mg/dL  Borderline High...............130-159 mg/dL  High..........................160-189 mg/dL  Very High.....................>190 mg/dL         Magnesium         1.1       MRSA ID by PCR       Negative         Non-HDL Cholesterol         50  Comment: Risk category and Non-HDL cholesterol goals:  Coronary heart disease (CHD)or equivalent (10-year risk of CHD >20%):  Non-HDL cholesterol goal     <130 mg/dL  Two or more CHD risk factors and 10-year risk of CHD <= 20%:  Non-HDL cholesterol goal     <160 mg/dL  0 to 1 CHD risk factor:  Non-HDL cholesterol goal     <190 mg/dL         Phosphorus         2.8       POCT Glucose 246   240   171           Potassium         4.6       PROTEIN TOTAL         6.3       Sodium         134       Staph aureus ID by PCR       Negative         Total Cholesterol/HDL Ratio         2.5       Triglycerides         61  Comment: The National Cholesterol Education Program (NCEP) has set the  following guidelines (reference values) for triglycerides:  Normal......................<150 mg/dL  Borderline High.............150-199 mg/dL  High........................200-499 mg/dL         Troponin I         0.017  Comment: The reference interval for Troponin I represents the 99th percentile   cutoff   for our facility and is consistent with 3rd generation assay   performance.         Vancomycin, Random         13.8                                Significant Imaging:  Imaging results within the past 24 hours have been reviewed.

## 2022-09-11 NOTE — PLAN OF CARE
POC reviewed with patient, pt verbalized an understanding. Pt AAOX4 lying in bed with no signs of distress noted. Pt has IV abx infusing per and tely monitor on per orders. Pt skin is in tact with non-skid socks on. Pt blood glucose is being monitored as per order, SSI insulin given, pt tolerated well. Will continue to monitor  Problem: Adult Inpatient Plan of Care  Goal: Plan of Care Review  Outcome: Ongoing, Progressing

## 2022-09-11 NOTE — ASSESSMENT & PLAN NOTE
Likely 2/2 Apryl-Dixon tear preceded by retching.  Does report prior history of gastric ulcer, however denies significant NSAID use.  Hgb dropped overnight. Will plan EGD tomorrow  Continue PPI   NPO after midnight  Trend Hgb

## 2022-09-11 NOTE — PHARMACY MED REC
"[facility]           Pharmacy  Admission Medication Reconciliation     The home medication history was taken by Sheldon Ferrer PharmD.      Medication history obtained from Medications listed below were obtained from: Patient/family    Based on information gathered for medication list, you may go to "Admission" then "Reconcile Home Medications" tabs to review and/or act upon those items.     The home medication list has been updated by the Pharmacy department.   Please read ALL comments highlighted in yellow.   Please address this information as you see fit.    Feel free to contact us if you have any questions or require assistance.    The current inpatient medication list has been compared to the home medication list and the following discrepancies were noted:  Patient reports he/she IS TAKING the following which was not ordered upon admit  Amlodipine 10mg daily  Aspirin 81mg daily  Glimepiride 2mg daily    No current facility-administered medications on file prior to encounter.     Current Outpatient Medications on File Prior to Encounter   Medication Sig Dispense Refill    amLODIPine (NORVASC) 10 MG tablet Take 10 mg by mouth once daily.      aspirin (ECOTRIN) 81 MG EC tablet Take 81 mg by mouth once daily.      atorvastatin (LIPITOR) 10 MG tablet Take 1 tablet (10 mg total) by mouth once daily. 90 tablet 3    BREO ELLIPTA 100-25 mcg/dose diskus inhaler Inhale 1 puff into the lungs once daily.       glimepiride (AMARYL) 2 MG tablet TAKE ONE TABLET BY MOUTH BEFORE BREAKFAST ONCE DAILY (Patient taking differently: 1 mg 2 (two) times daily.) 30 tablet 11    insulin glargine U-300 conc (TOUJEO MAX U-300 SOLOSTAR) 300 unit/mL (3 mL) insulin pen Inject 66 Units into the skin every evening. In the evening      insulin needles, disposable, (PEN NEEDLE) 31 X 5/16 " Ndle relion pen needle 31g/ 8mm misc twice daily 100 each 11    lisinopril (PRINIVIL,ZESTRIL) 40 MG tablet TAKE ONE-HALF TABLET BY MOUTH " TWICE DAILY (Patient taking differently: 40 mg once daily.) 30 tablet 5    metFORMIN (GLUCOPHAGE) 1000 MG tablet Take 1 tablet (1,000 mg total) by mouth 2 (two) times daily. 60 tablet 11    montelukast (SINGULAIR) 10 mg tablet Take 1 tablet by mouth every evening.      multivitamin (ONE DAILY MULTIVITAMIN) per tablet Take 1 tablet by mouth once daily.      NOVOLOG FLEXPEN U-100 INSULIN 100 unit/mL (3 mL) InPn pen Inject into the skin before meals as needed. Per sliding scale      PROAIR HFA 90 mcg/actuation inhaler Inhale 1-2 puffs into the lungs every 6 (six) hours as needed.          Please address this information as you see fit.  Feel free to contact us if you have any questions or require assistance.    Sheldon Ferrer, PharmD  [phone]                  .

## 2022-09-11 NOTE — PROGRESS NOTES
09/10/22 2044   Patient Request   Patient Requested extra blanket   Nurse Notification   Bedside Nurse Notified? Yes   Name of Bedside Nurse LAILA Romero   Nurse Notfication Method Secure Chat  (attempted to call pct; no answer; notified bedside nurse)   Admission   Initial VN Admission Questions Complete   Shift   Virtual Nurse - Patient Verbalized Approval Of Camera Use;VN Rounding   Safety/Activity   Patient Rounds bed in low position;call light in patient/parent reach;clutter free environment maintained;visualized patient;placement of personal items at bedside   Safety Promotion/Fall Prevention assistive device/personal item within reach;bed alarm set;Fall Risk reviewed with patient/family;side rails raised x 2   Positioning   Body Position supine   Head of Bed (HOB) Positioning HOB at 30-45 degrees   Pain/Comfort/Sleep   Preferred Pain Scale number (Numeric Rating Pain Scale)   Comfort/Acceptable Pain Level 5   Pain Rating (0-10): Rest 0   VN cued in to pt's room with permission. Admission questions completed. Plan of care reviewed with pt. Pt denies any questions or concerns at this time. Call bell w/in reach. Instructed to call for needs/assist oob.
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
## 2022-09-11 NOTE — NURSING
Pt transferred to room from ED, ambulated to bed from stretcher, AAOX4 resp even unlabored, denies pain states feels much better. Wife at bedside, MD and VN notified of arrival will con't to monitor

## 2022-09-11 NOTE — PROGRESS NOTES
Pharmacokinetic Assessment Follow Up: IV Vancomycin    Vancomycin serum concentration assessment(s):    The random level was drawn correctly and can be used to guide therapy at this time. The measurement is below the desired definitive target range of 15 to 20 mcg/mL.    Vancomycin Regimen Plan:    Give 2,000 mg IV Vancomycin x 1 dose (15 mg/kg - dose capped at 2,000 mg due to JOSE CARLOS).  Re-dose when the random level is less than 20 mcg/mL, next level to be drawn at 1900 on 9/11    Drug levels (last 3 results):  Recent Labs   Lab Result Units 09/11/22  0155   Vancomycin, Random ug/mL 13.8       Pharmacy will continue to follow and monitor vancomycin.    Please contact pharmacy at extension [phone] for questions regarding this assessment.    Thank you for the consult,   Flory Asher       Patient brief summary:  Alfa Ortiz is a 67 y.o. male initiated on antimicrobial therapy with IV Vancomycin for treatment of bacteremia    The patient's current regimen is pulse dosing due to JOSE CARLOS.     Drug Allergies:   Review of patient's allergies indicates:   Allergen Reactions    Omnicef [cefdinir] Hives       Actual Body Weight:   145.3 kg    Renal Function:   Estimated Creatinine Clearance: 63.3 mL/min (A) (based on SCr of 1.7 mg/dL (H)).,     Dialysis Method (if applicable):  N/A    CBC (last 72 hours):  Recent Labs   Lab Result Units 09/10/22  1329 09/10/22  2112 09/11/22  0154   WBC K/uL 18.39*  18.39*  --  13.13*   Hemoglobin g/dL 13.0*  13.0*  --  11.6*   Hemoglobin A1C %  --  10.0*  --    Hematocrit % 38.5*  38.5*  --  35.0*   Platelets K/uL 224  224  --  187   Gran % % 83.3*  83.3*  --  82.0*   Lymph % % 6.3*  6.3*  --  8.8*   Mono % % 9.6  9.6  --  8.4   Eosinophil % % 0.1  0.1  --  0.2   Basophil % % 0.2  0.2  --  0.2   Differential Method  Automated  Automated  --  Automated       Metabolic Panel (last 72 hours):  Recent Labs   Lab Result Units 09/10/22  1329 09/10/22  1723 09/11/22  0155   Sodium mmol/L  135*  135*  --  134*   Potassium mmol/L 4.9  4.9  --  4.6   Chloride mmol/L 103  103  --  103   CO2 mmol/L 21*  21*  --  21*   Glucose mg/dL 227*  227*  --  204*   Glucose, UA   --  3+*  --    BUN mg/dL 26*  26*  --  24*   Creatinine mg/dL 1.8*  1.8*  --  1.7*   Albumin g/dL 3.7  3.7  --  3.0*   Total Bilirubin mg/dL 0.5  0.5  --  0.5   Alkaline Phosphatase U/L 76  76  --  61   AST U/L 24  24  --  18   ALT U/L 29  29  --  24   Magnesium mg/dL  --   --  1.1*   Phosphorus mg/dL  --   --  2.8       Vancomycin Administrations:  vancomycin given in the last 96 hours                     vancomycin (VANCOCIN) 2,000 mg in dextrose 5 % 500 mL IVPB (mg) 2,000 mg New Bag 09/10/22 1700                    Microbiologic Results:  Microbiology Results (last 7 days)       Procedure Component Value Units Date/Time    Blood culture x two cultures. Draw prior to antibiotics. [[id number]] Collected: 09/10/22 1320    Order Status: Completed Specimen: Blood from Peripheral, Hand, Right Updated: 09/11/22 0348     Blood Culture, Routine Gram stain peds bottle: Gram positive cocci      Positive results previously called 09/11/2022  03:48    Narrative:      Aerobic and anaerobic    MRSA/SA Rapid ID by PCR from Blood culture [[id number]] Collected: 09/11/22 0210    Order Status: Completed Updated: 09/11/22 0325     Staph aureus ID by PCR Negative     MRSA ID by PCR Negative    Narrative:      Aerobic and anaerobic    Blood culture x two cultures. Draw prior to antibiotics. [[id number]] Collected: 09/10/22 1329    Order Status: Completed Specimen: Blood from Peripheral, Forearm, Left Updated: 09/11/22 0210     Blood Culture, Routine Gram stain ru bottle: Gram positive cocci in clusters resembling Staph      Results called to and read back by: Jamaica Metz RN 09/11/2022  02:09    Narrative:      Aerobic and anaerobic    Influenza A & B by Molecular [[id number]] Collected: 09/10/22 1334    Order Status: Completed Specimen:  Nasopharyngeal Swab Updated: 09/10/22 1434     Influenza A, Molecular Negative     Influenza B, Molecular Negative     Flu A & B Source Nasal swab

## 2022-09-11 NOTE — H&P
"[facility] Medicine Progress Note     Admitting Team: [facility] Hospitalist Team B  Attending Physician: Dr. Garcia   Resident: MD Leidy  Intern: DO Colt    Date of Admit: 9/10/2022    Chief Complaint     Hematemesis for 1 day    Subjective:   Pt reports doing better this morning - no nausea or vomiting since yesterday. Doing well with Ab regimen - denies any new symptoms including CP, SOB.        Objective:   Last 24 Hour Vital Signs:  BP  Min: 117/64  Max: 177/66  Temp  Av.8 °F (37.7 °C)  Min: 97.6 °F (36.4 °C)  Max: 102 °F (38.9 °C)  Pulse  Av.4  Min: 91  Max: 121  Resp  Av.6  Min: 16  Max: 34  SpO2  Av.3 %  Min: 93 %  Max: 100 %  Weight  Av.3 kg (320 lb 5.3 oz)  Min: 145.3 kg (320 lb 5.3 oz)  Max: 145.3 kg (320 lb 5.3 oz)  Body mass index is 44.68 kg/m².  I/O last 3 completed shifts:  In: 3700 [IV Piggyback:3700]  Out: 450 [Urine:150; Emesis/NG output:300]    Physical Examination:  /64 (BP Location: Left arm, Patient Position: Sitting)   Pulse 96   Temp 98.7 °F (37.1 °C) (Oral)   Resp 18   Ht 5' 11" (1.803 m)   Wt (!) 145.3 kg (320 lb 5.3 oz)   SpO2 97%   BMI 44.68 kg/m²     General Appearance:    Alert, cooperative, no distress, appears stated age   Head:    Normocephalic, without obvious abnormality, atraumatic   Eyes:     conjunctiva/corneas clear, EOMI       Nose:   Nares normal, septum midline, mucosa normal, no drainage    or sinus tenderness   Throat:   Lips, mucosa, and tongue normal; teeth and gums normal   Neck:   Supple, symmetrical, trachea midline, no adenopathy;        thyroid:     Back:     Symmetric, no curvature, ROM normal, no CVA tenderness   Lungs:     Clear to auscultation bilaterally, respirations unlabored   Chest wall:    No tenderness or deformity   Heart:    Regular rate and rhythm, S1 and S2 normal, no murmur, rub   or gallop   Abdomen:     Soft, non-tender, bowel sounds active all four quadrants,     no masses, no organomegaly         "   Extremities:   Extremities normal, atraumatic, no cyanosis. Mild edema bilaterally    Pulses:   2+ and symmetric all extremities   Skin:   Skin color, texture, turgor normal, no rashes or lesions   Lymph nodes:   Cervical, supraclavicular, and axillary nodes normal   Neurologic:  Awake alert and oriented, moves all extremities. . Normal strength, sensation and reflexes       throughout         Laboratory:  CBC:   Lab Results   Component Value Date    WBC 13.13 (H) 09/11/2022    HGB 11.6 (L) 09/11/2022    HCT 35.0 (L) 09/11/2022     09/11/2022    MCV 89 09/11/2022    RDW 12.7 09/11/2022       BMP:   Lab Results   Component Value Date     (L) 09/11/2022    K 4.6 09/11/2022     09/11/2022    CO2 21 (L) 09/11/2022    BUN 24 (H) 09/11/2022    CREATININE 1.7 (H) 09/11/2022     (H) 09/11/2022    CALCIUM 8.4 (L) 09/11/2022    MG 1.1 (L) 09/11/2022    PHOS 2.8 09/11/2022     LFTs:   Lab Results   Component Value Date    PROT 6.3 09/11/2022    ALBUMIN 3.0 (L) 09/11/2022    BILITOT 0.5 09/11/2022    AST 18 09/11/2022    ALKPHOS 61 09/11/2022    ALT 24 09/11/2022     Coags: No results found for: INR, PROTIME, PTT  FLP:   Lab Results   Component Value Date    CHOL 83 (L) 09/11/2022    HDL 33 (L) 09/11/2022    LDLCALC 37.8 (L) 09/11/2022    TRIG 61 09/11/2022    CHOLHDL 39.8 09/11/2022     DM:   Lab Results   Component Value Date    HGBA1C 10.0 (H) 09/10/2022    HGBA1C 11.6 (H) 11/30/2017    HGBA1C 14.2 (H) 04/10/2017    LDLCALC 37.8 (L) 09/11/2022    CREATININE 1.7 (H) 09/11/2022     Thyroid:   Lab Results   Component Value Date    TSH 3.200 04/10/2017     Anemia:   Lab Results   Component Value Date    FERRITIN 258 09/10/2022     Cardiac:   Lab Results   Component Value Date    TROPONINI 0.017 09/11/2022    BNP <10 04/18/2016     Urinalysis:   Lab Results   Component Value Date    LABURIN No significant growth 06/22/2015    COLORU Yellow 09/10/2022    SPECGRAV >1.030 (A) 09/10/2022    NITRITE  Negative 09/10/2022    KETONESU Negative 09/10/2022    UROBILINOGEN Negative 09/10/2022    WBCUA 0 09/10/2022       Trended Lab Data:  Recent Labs   Lab 09/10/22  1329 09/11/22  0154 09/11/22  0155   WBC 18.39*  18.39* 13.13*  --    HGB 13.0*  13.0* 11.6*  --    HCT 38.5*  38.5* 35.0*  --      224 187  --    MCV 88  88 89  --    RDW 12.5  12.5 12.7  --    *  135*  --  134*   K 4.9  4.9  --  4.6     103  --  103   CO2 21*  21*  --  21*   BUN 26*  26*  --  24*   CREATININE 1.8*  1.8*  --  1.7*   *  227*  --  204*   PROT 7.5  7.5  --  6.3   ALBUMIN 3.7  3.7  --  3.0*   BILITOT 0.5  0.5  --  0.5   AST 24  24  --  18   ALKPHOS 76  76  --  61   ALT 29  29  --  24       Trended Cardiac Data:  Recent Labs   Lab 09/10/22  2112 09/11/22  0155   TROPONINI 0.020 0.017       Microbiology:  Cultures collected     Other Results:  EKG (my interpretation): normal     Radiology:  Imaging Results              CTA Acute GI [place], Abdomen and Pelvis (Final result)  Result time 09/10/22 17:24:09   Procedure changed from CT Abdomen Pelvis With Contrast     Final result by Douige Sr MD (09/10/22 17:24:09)                   Impression:      1. No acute abnormality  2. Diverticulosis of the colon most prominent in the sigmoid colon.  No evidence of acute diverticulitis.  3. Multilevel chronic and degenerative changes of the lumbar spine.  4. Small fat containing umbilical hernia.      Electronically signed by: Dougie Sr  Date:    09/10/2022  Time:    17:24               Narrative:    EXAMINATION:  CTA ACUTE GI BLEED, ABDOMEN AND PELVIS    CLINICAL HISTORY:  GI bleed;Abdominal abscess/infection suspected;gi bleed, abscess/infection;    TECHNIQUE:  Low dose axial images, sagittal and coronal reformations were obtained from the lung bases to the pubic symphysis before and after the IV administration of 130 mL of Omnipaque 350 .  CTA protocol.  MIPS were utilized.  Delayed phase images are  submitted also.    COMPARISON:  None.    FINDINGS:  Abdomen:    - Lower thorax:Bilateral gynecomastia.    NG tube is present.    - Lung bases: No infiltrates and no nodules.    - Liver: No focal mass.    - Gallbladder: No calcified gallstones.    - Bile Ducts: No evidence of intra or extra hepatic biliary ductal dilation.    - Spleen: Negative.    - Kidneys: No mass or hydronephrosis.    - Adrenals: Unremarkable.    - Pancreas: No mass or peripancreatic fat stranding.    - Retroperitoneum:  No significant adenopathy.    - Vascular: No abdominal aortic aneurysm.    - Abdominal wall:  Small fat containing umbilical hernia.    Pelvis:    Urinary bladder is within normal limits.    Bowel/Mesentery:    No evidence of bowel obstruction.  There is diverticulosis of the left colon most prominent sigmoid colon.  No evidence of acute diverticulitis.    No evidence of any intraluminal contrast extravasation or significant acute GI bleed.  Recommend clinical correlation.    Retained feces in the rectum and colon.    The appendix is within normal limits.    Bones:  No acute fractures.    Grade 1 retrolisthesis of L5 on S1.  Grade 1 spondylolisthesis of L4 on L5.  Moderate-severe disc space narrowing throughout the lumbar spine.  Moderate posterior disc osteophyte complex at L1-2 and L2-3.    Severe central canal narrowing at L1-2 with moderate central canal narrowing at L2-3 and L3-4.    Severe foraminal narrowing at L1-2 bilaterally, L2-3 bilaterally, L3-4 bilaterally, L4-5 on the right and L5-S1 bilaterally.    Facet arthropathy at L4-5 and L5-S1.    No abscess is identified.                                       X-Ray Chest AP Portable (Final result)  Result time 09/10/22 14:44:26      Final result by Marbin Moreno MD (09/10/22 14:44:26)                   Impression:      Bilateral mild diffuse nonspecific interstitial coarsening increased from prior.  Differential considerations include pulmonary edema, interstitial type  pneumonia or progression of chronic interstitial lung changes.  No consolidation.    Left costophrenic angle not well visualized which may reflect artifact versus pleural thickening/scarring versus trace pleural effusion.    Enteric tube in place with distal portion likely within the partially imaged stomach.      Electronically signed by: Marbin Moreno MD  Date:    09/10/2022  Time:    14:44               Narrative:    EXAMINATION:  XR CHEST AP PORTABLE    CLINICAL HISTORY:  Sepsis;    TECHNIQUE:  Single frontal view of the chest was performed.    COMPARISON:  Chest radiograph 04/21/2016    FINDINGS:  Patient is somewhat rotated.  Resolution is somewhat limited by body habitus with underpenetration.  Left lung apex is partially obscured by overlying chin soft tissues.  Monitoring leads overlie the chest.    Suspected enteric tube in place with distal port and tip below the diaphragm looped upon itself at the left upper quadrant likely within the stomach.  Cardiomediastinal silhouette is relatively midline and within normal limits for age allowing for magnification by chest wall and AP portable technique with patient rotation.  Pulmonary vasculature and hilar contours are grossly within normal limits.  Slight nonspecific elevation of the right hemidiaphragm.    Few scattered linear opacities throughout the lungs consistent with minimal platelike scarring versus atelectasis.  There is bilateral mild diffuse nonspecific interstitial coarsening increased from prior.  Left costophrenic angle not well visualized.  No large consolidation or definite pneumothorax.  No acute osseous process seen.  PA and lateral views can be obtained.                                      Assessment:     Alfa Ortiz is a 67 y.o. male with:  Patient Active Problem List    Diagnosis Date Noted    Coffee ground emesis 09/10/2022    Fever 09/10/2022    Sepsis 09/10/2022    Leukocytosis 09/10/2022    Elevated lactic acid level 09/10/2022     Asthmatic bronchitis with acute exacerbation 04/18/2016    BPH (benign prostatic hypertrophy) 06/22/2015    Panic disorder 05/28/2015    Dysphagia 05/28/2015    Acute UTI 05/28/2015    Hypertension 03/31/2015    Family history of early CAD 03/31/2015    Cellulitis of left leg 09/17/2013    Type 2 diabetes mellitus with diabetic polyneuropathy 08/01/2013    Hyperlipidemia 08/01/2013    Morbid obesity with BMI of 40.0-44.9, adult 08/01/2013    Elevated blood pressure 10/25/2012        Plan:     Sepsis, unknown origin  Bacteremia   Unclear etiology, presented with fever, WBC 18, lactic acid persistently elevated.   Pt w/ reported allergy to cefdinir.   - vitals stable though watching closely  - blood cultures collected, urine clean, CXR with mild R sided infiltrate and LLL poorly visualized though poor quality film. Getting CT chest.   - flu and covid negative   -MRSA/SA rapid by PCR negative   - blood cultures growing gram positive cocci, unclear source he has no recent surgery or metal. Possibly gut translocation. Getting ID on board.   - procal 0.69  - Continue IV vancomycin, stopped aztreonam and flagyl 9/11    DM  - home meds glimepiride, metformin 1000 BID  - SSI     Hematemesis, resolved  - presented with several episodes of vomiting followed by hematemesis, GI has seen and suspects timothy toni tear.   -PPI IV BID, type and screen  - h/h stable, repeat if bleeding.  - zofran PRN though symptoms improved at this time.     Normocytic anemia  - likely from vomiting as above but needs c-scope out pt  - iron pending and ferritin 258.     Asthma  Continue home breo and montelukast    CKD3 with JOSE CARLOS  - baseline Cr 1.4, now 1.8. holding lisinopril and giving fluids    HTN  Home meds: lisinopril, holding for now as above.     Diet: NPO  Code: full  PPx: holding for GIB  Dispo: resolution of sepsis.     Jeanmarie Forbes MD  [facility]    [facility] Hospitalist Pager numbers:   [facility] Hospitalist  Medicine Team A (Josh/Dennise): [phone]  [facility] Hospitalist Medicine Team B (Linda/Clementina):  [phone]

## 2022-09-11 NOTE — NURSING
Lab called with blood cultures results from anerobic bottle is positive for cocci/clusters MD notified

## 2022-09-12 ENCOUNTER — ANESTHESIA EVENT (OUTPATIENT)
Dept: ENDOSCOPY | Facility: HOSPITAL | Age: 67
DRG: 871 | End: 2022-09-12
Payer: MEDICARE

## 2022-09-12 ENCOUNTER — ANESTHESIA (OUTPATIENT)
Dept: ENDOSCOPY | Facility: HOSPITAL | Age: 67
DRG: 871 | End: 2022-09-12
Payer: MEDICARE

## 2022-09-12 LAB
ALBUMIN SERPL BCP-MCNC: 2.9 G/DL (ref 3.5–5.2)
ALP SERPL-CCNC: 61 U/L (ref 55–135)
ALT SERPL W/O P-5'-P-CCNC: 24 U/L (ref 10–44)
ANION GAP SERPL CALC-SCNC: 10 MMOL/L (ref 8–16)
AORTIC ROOT ANNULUS: 3.83 CM
AORTIC VALVE CUSP SEPERATION: 21.73 CM
AST SERPL-CCNC: 19 U/L (ref 10–40)
AV INDEX (PROSTH): 0.9
AV MEAN GRADIENT: 5 MMHG
AV PEAK GRADIENT: 9 MMHG
AV VALVE AREA: 3.01 CM2
AV VELOCITY RATIO: 0.85
BASOPHILS # BLD AUTO: 0.02 K/UL (ref 0–0.2)
BASOPHILS NFR BLD: 0.2 % (ref 0–1.9)
BILIRUB SERPL-MCNC: 0.5 MG/DL (ref 0.1–1)
BSA FOR ECHO PROCEDURE: 2.7 M2
BUN SERPL-MCNC: 23 MG/DL (ref 8–23)
CALCIUM SERPL-MCNC: 8.1 MG/DL (ref 8.7–10.5)
CHLORIDE SERPL-SCNC: 103 MMOL/L (ref 95–110)
CO2 SERPL-SCNC: 22 MMOL/L (ref 23–29)
CREAT SERPL-MCNC: 1.6 MG/DL (ref 0.5–1.4)
CV ECHO LV RWT: 0.6 CM
DIFFERENTIAL METHOD: ABNORMAL
DOP CALC AO PEAK VEL: 1.47 M/S
DOP CALC AO VTI: 27.9 CM
DOP CALC LVOT AREA: 3.3 CM2
DOP CALC LVOT DIAMETER: 2.06 CM
DOP CALC LVOT PEAK VEL: 1.25 M/S
DOP CALC LVOT STROKE VOLUME: 83.95 CM3
DOP CALC MV VTI: 29.6 CM
DOP CALCLVOT PEAK VEL VTI: 25.2 CM
E WAVE DECELERATION TIME: 166.03 MSEC
E/A RATIO: 0.86
E/E' RATIO: 10.67 M/S
ECHO LV POSTERIOR WALL: 1.27 CM (ref 0.6–1.1)
EJECTION FRACTION: 65 %
EOSINOPHIL # BLD AUTO: 0.1 K/UL (ref 0–0.5)
EOSINOPHIL NFR BLD: 1 % (ref 0–8)
ERYTHROCYTE [DISTWIDTH] IN BLOOD BY AUTOMATED COUNT: 12.7 % (ref 11.5–14.5)
EST. GFR  (NO RACE VARIABLE): 48 ML/MIN/1.73 M^2
FRACTIONAL SHORTENING: 23 % (ref 28–44)
GLUCOSE SERPL-MCNC: 215 MG/DL (ref 70–110)
HCT VFR BLD AUTO: 31.8 % (ref 40–54)
HGB BLD-MCNC: 10.8 G/DL (ref 14–18)
IMM GRANULOCYTES # BLD AUTO: 0.04 K/UL (ref 0–0.04)
IMM GRANULOCYTES NFR BLD AUTO: 0.5 % (ref 0–0.5)
INTERVENTRICULAR SEPTUM: 1.59 CM (ref 0.6–1.1)
IVRT: 51.38 MSEC
LA MAJOR: 6 CM
LA MINOR: 6.57 CM
LA WIDTH: 3.7 CM
LEFT ATRIUM SIZE: 4.61 CM
LEFT ATRIUM VOLUME INDEX MOD: 21.6 ML/M2
LEFT ATRIUM VOLUME INDEX: 35.2 ML/M2
LEFT ATRIUM VOLUME MOD: 55.61 CM3
LEFT ATRIUM VOLUME: 90.94 CM3
LEFT INTERNAL DIMENSION IN SYSTOLE: 3.27 CM (ref 2.1–4)
LEFT VENTRICLE DIASTOLIC VOLUME INDEX: 31.11 ML/M2
LEFT VENTRICLE DIASTOLIC VOLUME: 80.26 ML
LEFT VENTRICLE MASS INDEX: 91 G/M2
LEFT VENTRICLE SYSTOLIC VOLUME INDEX: 16.7 ML/M2
LEFT VENTRICLE SYSTOLIC VOLUME: 43.19 ML
LEFT VENTRICULAR INTERNAL DIMENSION IN DIASTOLE: 4.24 CM (ref 3.5–6)
LEFT VENTRICULAR MASS: 234.96 G
LV LATERAL E/E' RATIO: 9.6 M/S
LV SEPTAL E/E' RATIO: 12 M/S
LVOT MG: 3.5 MMHG
LVOT MV: 0.88 CM/S
LYMPHOCYTES # BLD AUTO: 1.3 K/UL (ref 1–4.8)
LYMPHOCYTES NFR BLD: 14.5 % (ref 18–48)
MAGNESIUM SERPL-MCNC: 1.3 MG/DL (ref 1.6–2.6)
MCH RBC QN AUTO: 29.7 PG (ref 27–31)
MCHC RBC AUTO-ENTMCNC: 34 G/DL (ref 32–36)
MCV RBC AUTO: 87 FL (ref 82–98)
MONOCYTES # BLD AUTO: 1.2 K/UL (ref 0.3–1)
MONOCYTES NFR BLD: 13.4 % (ref 4–15)
MV MEAN GRADIENT: 3 MMHG
MV PEAK A VEL: 1.11 M/S
MV PEAK E VEL: 0.96 M/S
MV PEAK GRADIENT: 6 MMHG
MV STENOSIS PRESSURE HALF TIME: 48.15 MS
MV VALVE AREA BY CONTINUITY EQUATION: 2.84 CM2
MV VALVE AREA P 1/2 METHOD: 4.57 CM2
NEUTROPHILS # BLD AUTO: 6.1 K/UL (ref 1.8–7.7)
NEUTROPHILS NFR BLD: 70.4 % (ref 38–73)
NRBC BLD-RTO: 0 /100 WBC
PHOSPHATE SERPL-MCNC: 2.9 MG/DL (ref 2.7–4.5)
PISA TR MAX VEL: 1.8 M/S
PLATELET # BLD AUTO: 171 K/UL (ref 150–450)
PMV BLD AUTO: 9.6 FL (ref 9.2–12.9)
POCT GLUCOSE: 183 MG/DL (ref 70–110)
POCT GLUCOSE: 227 MG/DL (ref 70–110)
POCT GLUCOSE: 229 MG/DL (ref 70–110)
POCT GLUCOSE: 230 MG/DL (ref 70–110)
POCT GLUCOSE: 337 MG/DL (ref 70–110)
POTASSIUM SERPL-SCNC: 4.3 MMOL/L (ref 3.5–5.1)
PROT SERPL-MCNC: 5.8 G/DL (ref 6–8.4)
PULM VEIN S/D RATIO: 1.48
PV MV: 1.15 M/S
PV PEAK D VEL: 0.25 M/S
PV PEAK S VEL: 0.37 M/S
PV PEAK VELOCITY: 1.82 CM/S
RA MAJOR: 5.07 CM
RA PRESSURE: 8 MMHG
RA WIDTH: 3.2 CM
RBC # BLD AUTO: 3.64 M/UL (ref 4.6–6.2)
RIGHT VENTRICULAR END-DIASTOLIC DIMENSION: 2.21 CM
SODIUM SERPL-SCNC: 135 MMOL/L (ref 136–145)
TDI LATERAL: 0.1 M/S
TDI SEPTAL: 0.08 M/S
TDI: 0.09 M/S
TR MAX PG: 13 MMHG
TV REST PULMONARY ARTERY PRESSURE: 21 MMHG
WBC # BLD AUTO: 8.67 K/UL (ref 3.9–12.7)

## 2022-09-12 PROCEDURE — C9113 INJ PANTOPRAZOLE SODIUM, VIA: HCPCS | Performed by: STUDENT IN AN ORGANIZED HEALTH CARE EDUCATION/TRAINING PROGRAM

## 2022-09-12 PROCEDURE — 63600175 PHARM REV CODE 636 W HCPCS: Performed by: NURSE ANESTHETIST, CERTIFIED REGISTERED

## 2022-09-12 PROCEDURE — 99900035 HC TECH TIME PER 15 MIN (STAT)

## 2022-09-12 PROCEDURE — 94760 N-INVAS EAR/PLS OXIMETRY 1: CPT

## 2022-09-12 PROCEDURE — 63600175 PHARM REV CODE 636 W HCPCS: Performed by: STUDENT IN AN ORGANIZED HEALTH CARE EDUCATION/TRAINING PROGRAM

## 2022-09-12 PROCEDURE — 94640 AIRWAY INHALATION TREATMENT: CPT

## 2022-09-12 PROCEDURE — 83735 ASSAY OF MAGNESIUM: CPT | Performed by: STUDENT IN AN ORGANIZED HEALTH CARE EDUCATION/TRAINING PROGRAM

## 2022-09-12 PROCEDURE — 43235 EGD DIAGNOSTIC BRUSH WASH: CPT | Performed by: INTERNAL MEDICINE

## 2022-09-12 PROCEDURE — 37000008 HC ANESTHESIA 1ST 15 MINUTES: Performed by: INTERNAL MEDICINE

## 2022-09-12 PROCEDURE — 37000009 HC ANESTHESIA EA ADD 15 MINS: Performed by: INTERNAL MEDICINE

## 2022-09-12 PROCEDURE — 85025 COMPLETE CBC W/AUTO DIFF WBC: CPT | Performed by: STUDENT IN AN ORGANIZED HEALTH CARE EDUCATION/TRAINING PROGRAM

## 2022-09-12 PROCEDURE — 11000001 HC ACUTE MED/SURG PRIVATE ROOM

## 2022-09-12 PROCEDURE — 25000003 PHARM REV CODE 250: Performed by: STUDENT IN AN ORGANIZED HEALTH CARE EDUCATION/TRAINING PROGRAM

## 2022-09-12 PROCEDURE — 36415 COLL VENOUS BLD VENIPUNCTURE: CPT | Performed by: STUDENT IN AN ORGANIZED HEALTH CARE EDUCATION/TRAINING PROGRAM

## 2022-09-12 PROCEDURE — 25000003 PHARM REV CODE 250: Performed by: NURSE ANESTHETIST, CERTIFIED REGISTERED

## 2022-09-12 PROCEDURE — 25500020 PHARM REV CODE 255: Performed by: INTERNAL MEDICINE

## 2022-09-12 PROCEDURE — 84100 ASSAY OF PHOSPHORUS: CPT | Performed by: STUDENT IN AN ORGANIZED HEALTH CARE EDUCATION/TRAINING PROGRAM

## 2022-09-12 PROCEDURE — 80053 COMPREHEN METABOLIC PANEL: CPT | Performed by: STUDENT IN AN ORGANIZED HEALTH CARE EDUCATION/TRAINING PROGRAM

## 2022-09-12 PROCEDURE — 25000003 PHARM REV CODE 250: Performed by: INTERNAL MEDICINE

## 2022-09-12 PROCEDURE — 63600175 PHARM REV CODE 636 W HCPCS: Performed by: INTERNAL MEDICINE

## 2022-09-12 RX ORDER — MAGNESIUM SULFATE HEPTAHYDRATE 40 MG/ML
2 INJECTION, SOLUTION INTRAVENOUS ONCE
Status: COMPLETED | OUTPATIENT
Start: 2022-09-12 | End: 2022-09-12

## 2022-09-12 RX ORDER — PROPOFOL 10 MG/ML
VIAL (ML) INTRAVENOUS
Status: DISCONTINUED | OUTPATIENT
Start: 2022-09-12 | End: 2022-09-12

## 2022-09-12 RX ORDER — PROPOFOL 10 MG/ML
VIAL (ML) INTRAVENOUS CONTINUOUS PRN
Status: DISCONTINUED | OUTPATIENT
Start: 2022-09-12 | End: 2022-09-12

## 2022-09-12 RX ORDER — LIDOCAINE HYDROCHLORIDE 20 MG/ML
INJECTION INTRAVENOUS
Status: DISCONTINUED | OUTPATIENT
Start: 2022-09-12 | End: 2022-09-12

## 2022-09-12 RX ADMIN — FLUTICASONE FUROATE AND VILANTEROL TRIFENATATE 1 PUFF: 100; 25 POWDER RESPIRATORY (INHALATION) at 07:09

## 2022-09-12 RX ADMIN — INSULIN ASPART 2 UNITS: 100 INJECTION, SOLUTION INTRAVENOUS; SUBCUTANEOUS at 05:09

## 2022-09-12 RX ADMIN — HUMAN ALBUMIN MICROSPHERES AND PERFLUTREN 0.11 MG: 10; .22 INJECTION, SOLUTION INTRAVENOUS at 11:09

## 2022-09-12 RX ADMIN — INSULIN ASPART 2 UNITS: 100 INJECTION, SOLUTION INTRAVENOUS; SUBCUTANEOUS at 09:09

## 2022-09-12 RX ADMIN — PROPOFOL 175 MCG/KG/MIN: 10 INJECTION, EMULSION INTRAVENOUS at 12:09

## 2022-09-12 RX ADMIN — ATORVASTATIN CALCIUM 10 MG: 10 TABLET, FILM COATED ORAL at 09:09

## 2022-09-12 RX ADMIN — TAMSULOSIN HYDROCHLORIDE 0.4 MG: 0.4 CAPSULE ORAL at 09:09

## 2022-09-12 RX ADMIN — LIDOCAINE HYDROCHLORIDE 100 MG: 20 INJECTION, SOLUTION INTRAVENOUS at 12:09

## 2022-09-12 RX ADMIN — PROPOFOL 100 MG: 10 INJECTION, EMULSION INTRAVENOUS at 12:09

## 2022-09-12 RX ADMIN — GLYCOPYRROLATE 0.1 MG: 0.2 INJECTION, SOLUTION INTRAMUSCULAR; INTRAVITREAL at 12:09

## 2022-09-12 RX ADMIN — VANCOMYCIN HYDROCHLORIDE 1250 MG: 1.25 INJECTION, POWDER, LYOPHILIZED, FOR SOLUTION INTRAVENOUS at 11:09

## 2022-09-12 RX ADMIN — MONTELUKAST 10 MG: 10 TABLET, FILM COATED ORAL at 09:09

## 2022-09-12 RX ADMIN — PANTOPRAZOLE SODIUM 40 MG: 40 INJECTION, POWDER, FOR SOLUTION INTRAVENOUS at 09:09

## 2022-09-12 RX ADMIN — PROPOFOL 75 MG: 10 INJECTION, EMULSION INTRAVENOUS at 12:09

## 2022-09-12 RX ADMIN — MAGNESIUM SULFATE 2 G: 2 INJECTION INTRAVENOUS at 09:09

## 2022-09-12 RX ADMIN — VANCOMYCIN HYDROCHLORIDE 1250 MG: 1.25 INJECTION, POWDER, LYOPHILIZED, FOR SOLUTION INTRAVENOUS at 09:09

## 2022-09-12 RX ADMIN — INSULIN ASPART 2 UNITS: 100 INJECTION, SOLUTION INTRAVENOUS; SUBCUTANEOUS at 02:09

## 2022-09-12 RX ADMIN — SODIUM CHLORIDE, SODIUM LACTATE, POTASSIUM CHLORIDE, AND CALCIUM CHLORIDE: .6; .31; .03; .02 INJECTION, SOLUTION INTRAVENOUS at 12:09

## 2022-09-12 RX ADMIN — TOPICAL ANESTHETIC 1 EACH: 200 SPRAY DENTAL; PERIODONTAL at 12:09

## 2022-09-12 NOTE — PLAN OF CARE
[facility]   SW sent [facility] referral via [facility].     Pt expressed that he is agreeable to administer IV abx upon dc. SW will continue to follow pt throughout his transitions of care and assist with any dc needs.       [facility]: YES response from [facility] re: Referral [id number] for patient in [facility] [id number] A: Yes, willing to accept patient patient will be seen on Wed with our [place] [facility] agency Thanks         09/12/22 1342   Post-Acute Status   Post-Acute Authorization Home Health;IV Infusion   [facility] Status Referrals Sent

## 2022-09-12 NOTE — PROGRESS NOTES
Pharmacokinetic Assessment Follow Up: IV Vancomycin    Vancomycin serum concentration assessment(s):    The random level was drawn correctly and can be used to guide therapy at this time. The measurement is within the desired definitive target range of 15 to 20 mcg/mL.    Vancomycin Regimen Plan:    Change regimen to Vancomycin 1250 mg IV every 12 hours with next serum trough concentration measured at 0800 prior to fourth dose on 09/13/2022    Drug levels (last 3 results):  Recent Labs   Lab Result Units 09/11/22  0155 09/11/22  1859   Vancomycin, Random ug/mL 13.8 18.5       Pharmacy will continue to follow and monitor vancomycin.    Please contact pharmacy at extension [phone] for questions regarding this assessment.    Thank you for the consult,   Flaquita Rico       Patient brief summary:  Alfa Ortiz is a 67 y.o. male initiated on antimicrobial therapy with IV Vancomycin for treatment of bacteremia    The patient's current regimen is vanco 1250 mg q12h     Drug Allergies:   Review of patient's allergies indicates:   Allergen Reactions    Omnicef [cefdinir] Hives       Actual Body Weight:   145.3 kg    Renal Function:   Estimated Creatinine Clearance: 63.3 mL/min (A) (based on SCr of 1.7 mg/dL (H)).,     Dialysis Method (if applicable):  N/A    CBC (last 72 hours):  Recent Labs   Lab Result Units 09/10/22  1329 09/10/22  2112 09/11/22  0154   WBC K/uL 18.39*  18.39*  --  13.13*   Hemoglobin g/dL 13.0*  13.0*  --  11.6*   Hemoglobin A1C %  --  10.0*  --    Hematocrit % 38.5*  38.5*  --  35.0*   Platelets K/uL 224  224  --  187   Gran % % 83.3*  83.3*  --  82.0*   Lymph % % 6.3*  6.3*  --  8.8*   Mono % % 9.6  9.6  --  8.4   Eosinophil % % 0.1  0.1  --  0.2   Basophil % % 0.2  0.2  --  0.2   Differential Method  Automated  Automated  --  Automated       Metabolic Panel (last 72 hours):  Recent Labs   Lab Result Units 09/10/22  1329 09/10/22  1723 09/11/22  0155   Sodium mmol/L 135*  135*   --  134*   Potassium mmol/L 4.9  4.9  --  4.6   Chloride mmol/L 103  103  --  103   CO2 mmol/L 21*  21*  --  21*   Glucose mg/dL 227*  227*  --  204*   Glucose, UA   --  3+*  --    BUN mg/dL 26*  26*  --  24*   Creatinine mg/dL 1.8*  1.8*  --  1.7*   Albumin g/dL 3.7  3.7  --  3.0*   Total Bilirubin mg/dL 0.5  0.5  --  0.5   Alkaline Phosphatase U/L 76  76  --  61   AST U/L 24  24  --  18   ALT U/L 29  29  --  24   Magnesium mg/dL  --   --  1.1*   Phosphorus mg/dL  --   --  2.8       Vancomycin Administrations:  vancomycin given in the last 96 hours                     vancomycin 1.25 g in dextrose 5% 250 mL IVPB (ready to mix) (mg) 1,250 mg New Bag 09/11/22 2116    vancomycin (VANCOCIN) 2,000 mg in dextrose 5 % 500 mL IVPB (mg) 2,000 mg New Bag 09/11/22 0726    vancomycin (VANCOCIN) 2,000 mg in dextrose 5 % 500 mL IVPB (mg) 2,000 mg New Bag 09/10/22 1700                    Microbiologic Results:  Microbiology Results (last 7 days)       Procedure Component Value Units Date/Time    Blood culture [[id number]] Collected: 09/11/22 0920    Order Status: Sent Specimen: Blood Updated: 09/11/22 1515    Blood culture [[id number]] Collected: 09/11/22 0920    Order Status: Sent Specimen: Blood Updated: 09/11/22 1515    Blood culture x two cultures. Draw prior to antibiotics. [[id number]] Collected: 09/10/22 1329    Order Status: Completed Specimen: Blood from Peripheral, Forearm, Left Updated: 09/11/22 0622     Blood Culture, Routine Gram stain ru bottle: Gram positive cocci in clusters resembling Staph      Results called to and read back by: Jamaica Metz RN 09/11/2022  02:09      Gram stain aer bottle: Gram positive cocci in chains resembling Strep      Results called to and read back by: Janell Bueno 09/11/2022  06:21    Narrative:      Aerobic and anaerobic    Blood culture x two cultures. Draw prior to antibiotics. [[id number]] Collected: 09/10/22 1320    Order Status: Completed Specimen: Blood from Peripheral,  Hand, Right Updated: 09/11/22 0348     Blood Culture, Routine Gram stain peds bottle: Gram positive cocci      Positive results previously called 09/11/2022  03:48    Narrative:      Aerobic and anaerobic    MRSA/SA Rapid ID by PCR from Blood culture [[id number]] Collected: 09/11/22 0210    Order Status: Completed Updated: 09/11/22 0325     Staph aureus ID by PCR Negative     MRSA ID by PCR Negative    Narrative:      Aerobic and anaerobic    Influenza A & B by Molecular [[id number]] Collected: 09/10/22 1334    Order Status: Completed Specimen: Nasopharyngeal Swab Updated: 09/10/22 1434     Influenza A, Molecular Negative     Influenza B, Molecular Negative     Flu A & B Source Nasal swab

## 2022-09-12 NOTE — PLAN OF CARE
SW met with pt at bedside to discuss dc planning.     Pt confirmed information on chart. Pt resides in home with Janette Ortiz (Spouse)   [phone]. Pt is independent in ADLs. Pt reports having no DME/HH services. Pt reports upon dc his wife will transport him home. Pt reports that he is agreeable to administer IV abx upon dc with assistance from wife. Pt is also agreeable to getting HH services upon dc. SW wrote contact information on board for any questions or concerns. SW will continue to follow pt throughout his transitions of care and assist with any dc needs.       Patient Physical address is [address] [place] [id number]    Scheduled 9/15/22 at 11:45  Dr. Luu         09/12/22 1344   Discharge Assessment   Assessment Type Discharge Planning Assessment   Confirmed/corrected address, phone number and insurance Yes   Confirmed Demographics Correct on Facesheet   Source of Information patient   Communicated KRAIG with patient/caregiver Yes   Reason For Admission Coffee ground emesis   Lives With spouse;parent(s)   Do you expect to return to your current living situation? Yes   Do you have help at home or someone to help you manage your care at home? Yes   Who are your caregiver(s) and their phone number(s)? Janette Ortiz (Spouse)   [phone]   Prior to hospitilization cognitive status: Alert/Oriented   Current cognitive status: Alert/Oriented   Walking or Climbing Stairs Difficulty none   Dressing/Bathing Difficulty none   Home Layout Able to live on 1st floor   Equipment Currently Used at Home none   Readmission within 30 days? No   Patient currently being followed by outpatient case management? No   Do you currently have service(s) that help you manage your care at home? No   Do you take prescription medications? Yes   Do you have prescription coverage? Yes   Coverage [employer]   Do you have any problems affording any of your prescribed medications? No   Is the patient taking  medications as prescribed? yes   Who is going to help you get home at discharge? Janette Ortiz (Spouse)   [phone]   How do you get to doctors appointments? family or friend will provide;car, drives self   Are you on dialysis? No   Do you take coumadin? No   Discharge Plan A Home Health;Home with family   DME Needed Upon Discharge    (TBD)   Discharge Plan discussed with: Patient   Discharge Barriers Identified None   Physical Activity   On average, how many days per week do you engage in moderate to strenuous exercise (like a brisk walk)? 0 days   On average, how many minutes do you engage in exercise at this level? 0 min   Financial Resource Strain   How hard is it for you to pay for the very basics like food, housing, medical care, and heating? Not hard   Housing Stability   In the last 12 months, was there a time when you were not able to pay the mortgage or rent on time? N   In the last 12 months, was there a time when you did not have a steady place to sleep or slept in a shelter (including now)? N   Transportation Needs   In the past 12 months, has lack of transportation kept you from medical appointments or from getting medications? no   In the past 12 months, has lack of transportation kept you from meetings, work, or from getting things needed for daily living? No   Food Insecurity   Within the past 12 months, you worried that your food would run out before you got the money to buy more. Never true   Within the past 12 months, the food you bought just didn't last and you didn't have money to get more. Never true   Social Connections   In a typical week, how many times do you talk on the phone with family, friends, or neighbors? Never   Are you , , , , never , or living with a partner?    Alcohol Use   Q1: How often do you have a drink containing alcohol? Never   Q2: How many drinks containing alcohol do you have on a typical day when you are drinking? None    Q3: How often do you have six or more drinks on one occasion? Never   Relationship/Environment   Name(s) of Who Lives With Patient Klever Ortize (Spouse)   [phone]

## 2022-09-12 NOTE — PROGRESS NOTES
[facility] Infectious Disease Progress Note     Primary Team: MD Clementina  Consultant Attending: MD Thiago  Date of Admit: 9/10/2022     Assessment     Alfa Ortiz is a 67 y.o. male with:  1. Positive blood cultures for multiple organisms.  Workup in process.  Patient does not have obvious source of infection at this time although patient did have hematemesis.  Workup for that is in process.  Patient also had fever and white count elevation  2. Right lower extremity venous stasis/cellulitis.  Patient has warmth and some tenderness with edema.  Does have venous stasis distribution but could potentially be associated with cellulitis especially with fever prior to admission.  Could potentially explain positive blood culture.  3. Hematemesis.  GI consulted  4. Diabetes, obesity, hypertension, hyperlipidemia, asthma      Plan     1. Continue vancomycin for the moment  2. Further workup based upon identity of positive blood cultures  3. Leg elevation  4. Monitor patient     Thank you for your consult. We will continue to follow the patient. Please contact us if you have any additional questions.     Shruthi Rosales MD  Internal / Emergency Medicine, PGY-2  [facility]    Interval History     Assessment deferred; patient in endoscopy suite for procedure.    Medications reviewed; current antibiotics:  Vancomycin    Review of Systems   Unable to perform ROS: Other (in endoscopy suite)       Objective:   BP  Min: 117/64  Max: 143/66  Temp  Av.3 °F (36.8 °C)  Min: 97.3 °F (36.3 °C)  Max: 99 °F (37.2 °C)  Pulse  Av.8  Min: 91  Max: 101  Resp  Av.3  Min: 16  Max: 22  SpO2  Av.3 %  Min: 95 %  Max: 98 %      Physical Examination   Physical Exam  Deferred; patient in endoscopy suite.      Laboratory Data Reviewed:  Recent Labs   Lab 09/10/22  1329 22  0154 22  0155 22  0314   WBC 18.39*  18.39* 13.13*  --  8.67   HGB 13.0*  13.0* 11.6*  --  10.8*   HCT 38.5*  38.5* 35.0*  --   31.8*     224 187  --  171   *  135*  --  134* 135*   K 4.9  4.9  --  4.6 4.3     103  --  103 103   CREATININE 1.8*  1.8*  --  1.7* 1.6*   BUN 26*  26*  --  24* 23   CO2 21*  21*  --  21* 22*   ALT 29  29  --  24 24   AST 24  24  --  18 19         Microbiology Data Reviewed:  Microbiology Results (last 7 days)       Procedure Component Value Units Date/Time    Blood culture x two cultures. Draw prior to antibiotics. [[id number]]  (Abnormal) Collected: 09/10/22 1329    Order Status: Completed Specimen: Blood from Peripheral, Forearm, Left Updated: 09/12/22 0754     Blood Culture, Routine Gram stain ru bottle: Gram positive cocci in clusters resembling Staph      Results called to and read back by: Jamaica Metz RN 09/11/2022  02:09      Gram stain aer bottle: Gram positive cocci in chains resembling Strep      Results called to and read back by: Janell Bueno 09/11/2022  06:21      STREPTOCOCCUS AGALACTIAE (GROUP B)  Beta-hemolytic streptococci are routinely susceptible to   penicillins,cephalosporins and carbapenems.  For susceptibility see order #[id number]        STAPHYLOCOCCUS AUREUS  Susceptibility pending  [facility].      Narrative:      Aerobic and anaerobic    Blood culture x two cultures. Draw prior to antibiotics. [[id number]]  (Abnormal) Collected: 09/10/22 1320    Order Status: Completed Specimen: Blood from Peripheral, Hand, Right Updated: 09/12/22 0747     Blood Culture, Routine Gram stain peds bottle: Gram positive cocci      Positive results previously called 09/11/2022  03:48      STREPTOCOCCUS AGALACTIAE (GROUP B)  Susceptibility pending  Beta-hemolytic streptococci are routinely susceptible to   penicillins,cephalosporins and carbapenems.      Narrative:      Aerobic and anaerobic    Blood culture [[id number]] Collected: 09/11/22 0920    Order Status: Completed Specimen: Blood Updated: 09/12/22 0115     Blood Culture,  Routine No Growth to date    Blood culture [[id number]] Collected: 09/11/22 0920    Order Status: Completed Specimen: Blood Updated: 09/12/22 0115     Blood Culture, Routine No Growth to date    MRSA/SA Rapid ID by PCR from Blood culture [[id number]] Collected: 09/11/22 0210    Order Status: Completed Updated: 09/11/22 0325     Staph aureus ID by PCR Negative     MRSA ID by PCR Negative    Narrative:      Aerobic and anaerobic    Influenza A & B by Molecular [[id number]] Collected: 09/10/22 1334    Order Status: Completed Specimen: Nasopharyngeal Swab Updated: 09/10/22 1434     Influenza A, Molecular Negative     Influenza B, Molecular Negative     Flu A & B Source Nasal swab              Current Medications   atorvastatin  10 mg Oral Daily    fluticasone furoate-vilanteroL  1 puff Inhalation Daily    insulin detemir U-100  10 Units Subcutaneous QHS    magnesium sulfate IVPB  2 g Intravenous Once    montelukast  10 mg Oral QHS    pantoprazole  40 mg Intravenous BID    tamsulosin  0.4 mg Oral Daily    vancomycin (VANCOCIN) IVPB  1,250 mg Intravenous Q12H

## 2022-09-12 NOTE — ANESTHESIA POSTPROCEDURE EVALUATION
Anesthesia Post Evaluation    Patient: Alfa Ortiz    Procedure(s) Performed: Procedure(s) (LRB):  EGD (ESOPHAGOGASTRODUODENOSCOPY) (N/A)    Final Anesthesia Type: MAC      Patient location during evaluation: GI PACU  Patient participation: Yes- Able to Participate  Level of consciousness: awake and alert and awake  Post-procedure vital signs: reviewed and stable  Pain management: adequate  Airway patency: patent    PONV status at discharge: No PONV  Anesthetic complications: no      Cardiovascular status: blood pressure returned to baseline, hemodynamically stable and stable  Respiratory status: unassisted, spontaneous ventilation and room air  Hydration status: euvolemic  Follow-up not needed.          Vitals Value Taken Time   BP  09/12/22 1307   Temp  09/12/22 1307   Pulse  09/12/22 1307   Resp  09/12/22 1307   SpO2  09/12/22 1307     See nursing notes for vitals   No case tracking events are documented in the log.      Pain/Gato Score: No data recorded

## 2022-09-12 NOTE — [employer] PATIENT INSTRUCTIONS
Discharge Summary/Instructions after an Endoscopic Procedure  Patient Name: Alfa Ortiz  Patient MRN: [id number]  Patient YOB: 1955 Monday, September 12, 2022  Jeanmarie Payan MD  Dear patient,  As a result of recent federal legislation (The Federal Cures Act), you may   receive lab or pathology results from your procedure in your [facility]   account before your physician is able to contact you. Your physician or   their representative will relay the results to you with their   recommendations at their soonest availability.  Thank you,  Your health is very important to us during the Covid Crisis. Following your   procedure today, you will receive a daily text for 2 weeks asking about   signs or symptoms of Covid 19.  Please respond to this text when you   receive it so we can follow up and keep you as safe as possible.   RESTRICTIONS:  During your procedure today, you received medications for sedation.  These   medications may affect your judgment, balance and coordination.  Therefore,   for 24 hours, you have the following restrictions:   - DO NOT drive a car, operate machinery, make legal/financial decisions,   sign important papers or drink alcohol.    ACTIVITY:  Today: no heavy lifting, straining or running due to procedural   sedation/anesthesia.  The following day: return to full activity including work.  DIET:  Eat and drink normally unless instructed otherwise.     TREATMENT FOR COMMON SIDE EFFECTS:  - Mild abdominal pain, nausea, belching, bloating or excessive gas:  rest,   eat lightly and use a heating pad.  - Sore Throat: treat with throat lozenges and/or gargle with warm salt   water.  - Because air was used during the procedure, expelling large amounts of air   from your rectum or belching is normal.  - If a bowel prep was taken, you may not have a bowel movement for 1-3 days.    This is normal.  SYMPTOMS TO WATCH FOR AND REPORT TO YOUR PHYSICIAN:  1. Abdominal pain or bloating, other than gas  cramps.  2. Chest pain.  3. Back pain.  4. Signs of infection such as: chills or fever occurring within 24 hours   after the procedure.  5. Rectal bleeding, which would show as bright red, maroon, or black stools.   (A tablespoon of blood from the rectum is not serious, especially if   hemorrhoids are present.)  6. Vomiting.  7. Weakness or dizziness.  GO DIRECTLY TO THE NEAREST EMERGENCY ROOM IF YOU HAVE ANY OF THE FOLLOWING:      Difficulty breathing              Chills and/or fever over 101 F   Persistent vomiting and/or vomiting blood   Severe abdominal pain   Severe chest pain   Black, tarry stools   Bleeding- more than one tablespoon   Any other symptom or condition that you feel may need urgent attention  Your doctor recommends these additional instructions:  If any biopsies were taken, your doctors clinic will contact you in 1 to 2   weeks with any results.  - PRN anti-emetics for any further nausea/vomiting  - Advance diet as tolerated  - [facility].  For questions, problems or results please call your physician - Jeanmarie Payan MD.  EMERGENCY PHONE NUMBER: [phone],  LAB RESULTS: [phone]  IF A COMPLICATION OR EMERGENCY SITUATION ARISES AND YOU ARE UNABLE TO REACH   YOUR PHYSICIAN - GO DIRECTLY TO THE EMERGENCY ROOM.  MD Jeanmarie Coyne MD  9/12/2022 1:31:09 PM  This report has been verified and signed electronically.  Dear patient,  As a result of recent federal legislation (The Federal Cures Act), you may   receive lab or pathology results from your procedure in your [facility]   account before your physician is able to contact you. Your physician or   their representative will relay the results to you with their   recommendations at their soonest availability.  Thank you,  PROVATION   independent

## 2022-09-12 NOTE — TRANSFER OF CARE
"Anesthesia Transfer of Care Note    Patient: Alfa Ortiz    Procedure(s) Performed: Procedure(s) (LRB):  EGD (ESOPHAGOGASTRODUODENOSCOPY) (N/A)    Patient location: GI    Anesthesia Type: MAC    Transport from OR: Transported from OR on room air with adequate spontaneous ventilation    Post pain: adequate analgesia    Post assessment: tolerated procedure well and no apparent anesthetic complications    Post vital signs: stable    Level of consciousness: awake    Nausea/Vomiting: no nausea/vomiting    Complications: none    Transfer of care protocol was followed      Last vitals:   Visit Vitals  BP (!) 155/76 (Patient Position: Lying)   Pulse 99   Temp 36.8 °C (98.2 °F) (Temporal)   Resp 18   Ht 5' 11" (1.803 m)   Wt (!) 145.2 kg (320 lb)   SpO2 96%   BMI 44.63 kg/m²     "
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
## 2022-09-12 NOTE — PROGRESS NOTES
[facility] Medicine Progress Note    Primary Team: [facility] Hospitalist Team Team B  Attending Physician: Jeffrey Mcrae MD  Resident: Leidy  Intern: Colt    Subjective:   Pt reports newly noticed erythema, warmth, and swelling of RLE. He states he had some swelling bilaterally previously, but it was not red or as warm to the touch. Weakness improving; however, now with pain when standing on RLE. Denies CP, SOB, n/v and abdominal pain.     Objective:     Last 24 Hour Vital Signs:  BP  Min: 117/64  Max: 143/66  Temp  Av.6 °F (37 °C)  Min: 97.8 °F (36.6 °C)  Max: 99.2 °F (37.3 °C)  Pulse  Av  Min: 91  Max: 101  Resp  Av.9  Min: 18  Max: 22  SpO2  Av.7 %  Min: 95 %  Max: 99 %  No intake/output data recorded.    Physical Examination:  General Appearance:    Alert, cooperative, no distress, appears stated age   Head:    Normocephalic, without obvious abnormality, atraumatic   Eyes:     conjunctiva/corneas clear, EOMI         Nose:   Nares normal, septum midline, mucosa normal, no drainage    or sinus tenderness   Throat:   Lips, mucosa, and tongue normal; teeth and gums normal   Neck:   Supple, symmetrical, trachea midline, no adenopathy;        thyroid:     Back:     Symmetric, no curvature, ROM normal, no CVA tenderness   Lungs:     Clear to auscultation bilaterally, respirations unlabored   Chest wall:    No tenderness or deformity   Heart:    Regular rate and rhythm, S1 and S2 normal, no murmur, rub   or gallop   Abdomen:     Soft, non-tender, bowel sounds active all four quadrants,     no masses, no organomegaly              Extremities:   RLE erythema, swelling, and warmth extending from ankles to below knee. Atraumatic, no cyanosis. Mild non-erythematous edema LLE.     Pulses:   2+ and symmetric all extremities   Skin:   Skin color, texture, turgor normal, no rashes or lesions   Lymph nodes:   Cervical, supraclavicular, and axillary nodes normal   Neurologic:  Awake alert and oriented, moves  all extremities. . Normal strength, sensation and reflexes       throughout       Laboratory:  Laboratory Data Reviewed: yes  Lab Results   Component Value Date    WBC 8.67 09/12/2022    HGB 10.8 (L) 09/12/2022    HCT 31.8 (L) 09/12/2022    MCV 87 09/12/2022     09/12/2022     CMP  Sodium   Date Value Ref Range Status   09/12/2022 135 (L) 136 - 145 mmol/L Final     Potassium   Date Value Ref Range Status   09/12/2022 4.3 3.5 - 5.1 mmol/L Final     Chloride   Date Value Ref Range Status   09/12/2022 103 95 - 110 mmol/L Final     CO2   Date Value Ref Range Status   09/12/2022 22 (L) 23 - 29 mmol/L Final     Glucose   Date Value Ref Range Status   09/12/2022 215 (H) 70 - 110 mg/dL Final     BUN   Date Value Ref Range Status   09/12/2022 23 8 - 23 mg/dL Final     Creatinine   Date Value Ref Range Status   09/12/2022 1.6 (H) 0.5 - 1.4 mg/dL Final     Calcium   Date Value Ref Range Status   09/12/2022 8.1 (L) 8.7 - 10.5 mg/dL Final     Total Protein   Date Value Ref Range Status   09/12/2022 5.8 (L) 6.0 - 8.4 g/dL Final     Albumin   Date Value Ref Range Status   09/12/2022 2.9 (L) 3.5 - 5.2 g/dL Final     Total Bilirubin   Date Value Ref Range Status   09/12/2022 0.5 0.1 - 1.0 mg/dL Final     Comment:     For infants and newborns, interpretation of results should be based  on gestational age, weight and in agreement with clinical  observations.    Premature Infant recommended reference ranges:  Up to 24 hours.............<8.0 mg/dL  Up to 48 hours............<12.0 mg/dL  3-5 days..................<15.0 mg/dL  6-29 days.................<15.0 mg/dL       Alkaline Phosphatase   Date Value Ref Range Status   09/12/2022 61 55 - 135 U/L Final     AST   Date Value Ref Range Status   09/12/2022 19 10 - 40 U/L Final     ALT   Date Value Ref Range Status   09/12/2022 24 10 - 44 U/L Final     Anion Gap   Date Value Ref Range Status   09/12/2022 10 8 - 16 mmol/L Final     eGFR if    Date Value Ref Range Status    11/30/2017 53.3 (A) >60 mL/min/1.73 m^2 Final     eGFR if non    Date Value Ref Range Status   11/30/2017 46.1 (A) >60 mL/min/1.73 m^2 Final     Comment:     Calculation used to obtain the estimated glomerular filtration  rate (eGFR) is the CKD-EPI equation.            Microbiology Data Reviewed: yes  Microbiology Results (last 7 days)       Procedure Component Value Units Date/Time    Blood culture x two cultures. Draw prior to antibiotics. [[id number]]  (Abnormal) Collected: 09/10/22 1329    Order Status: Completed Specimen: Blood from Peripheral, Forearm, Left Updated: 09/12/22 0754     Blood Culture, Routine Gram stain ru bottle: Gram positive cocci in clusters resembling Staph      Results called to and read back by: Jamaica Metz RN 09/11/2022  02:09      Gram stain aer bottle: Gram positive cocci in chains resembling Strep      Results called to and read back by: Janell Bueno 09/11/2022  06:21      STREPTOCOCCUS AGALACTIAE (GROUP B)  Beta-hemolytic streptococci are routinely susceptible to   penicillins,cephalosporins and carbapenems.  For susceptibility see order #[id number]        STAPHYLOCOCCUS AUREUS  Susceptibility pending  ID consult required at [facility] and [facility] locations.      Narrative:      Aerobic and anaerobic    Blood culture x two cultures. Draw prior to antibiotics. [[id number]]  (Abnormal) Collected: 09/10/22 1320    Order Status: Completed Specimen: Blood from Peripheral, Hand, Right Updated: 09/12/22 0747     Blood Culture, Routine Gram stain peds bottle: Gram positive cocci      Positive results previously called 09/11/2022  03:48      STREPTOCOCCUS AGALACTIAE (GROUP B)  Susceptibility pending  Beta-hemolytic streptococci are routinely susceptible to   penicillins,cephalosporins and carbapenems.      Narrative:      Aerobic and anaerobic    Blood culture [[id number]] Collected: 09/11/22 0920    Order Status: Completed Specimen: Blood Updated: 09/12/22 0115      Blood Culture, Routine No Growth to date    Blood culture [[id number]] Collected: 09/11/22 0920    Order Status: Completed Specimen: Blood Updated: 09/12/22 0115     Blood Culture, Routine No Growth to date    MRSA/SA Rapid ID by PCR from Blood culture [[id number]] Collected: 09/11/22 0210    Order Status: Completed Updated: 09/11/22 0325     Staph aureus ID by PCR Negative     MRSA ID by PCR Negative    Narrative:      Aerobic and anaerobic    Influenza A & B by Molecular [[id number]] Collected: 09/10/22 1334    Order Status: Completed Specimen: Nasopharyngeal Swab Updated: 09/10/22 1434     Influenza A, Molecular Negative     Influenza B, Molecular Negative     Flu A & B Source Nasal swab              Other Results:  EKG (my interpretation): normal EKG, normal sinus rhythm, unchanged from previous tracings.    Radiology Data Reviewed: yes  Imaging Results              CTA Acute GI [place], Abdomen and Pelvis (Final result)  Result time 09/10/22 17:24:09   Procedure changed from CT Abdomen Pelvis With Contrast     Final result by Dougie Sr MD (09/10/22 17:24:09)                   Impression:      1. No acute abnormality  2. Diverticulosis of the colon most prominent in the sigmoid colon.  No evidence of acute diverticulitis.  3. Multilevel chronic and degenerative changes of the lumbar spine.  4. Small fat containing umbilical hernia.      Electronically signed by: Dougie Sr  Date:    09/10/2022  Time:    17:24               Narrative:    EXAMINATION:  CTA ACUTE GI BLEED, ABDOMEN AND PELVIS    CLINICAL HISTORY:  GI bleed;Abdominal abscess/infection suspected;gi bleed, abscess/infection;    TECHNIQUE:  Low dose axial images, sagittal and coronal reformations were obtained from the lung bases to the pubic symphysis before and after the IV administration of 130 mL of Omnipaque 350 .  CTA protocol.  MIPS were utilized.  Delayed phase images are submitted also.    COMPARISON:  None.    FINDINGS:  Abdomen:    -  Lower thorax:Bilateral gynecomastia.    NG tube is present.    - Lung bases: No infiltrates and no nodules.    - Liver: No focal mass.    - Gallbladder: No calcified gallstones.    - Bile Ducts: No evidence of intra or extra hepatic biliary ductal dilation.    - Spleen: Negative.    - Kidneys: No mass or hydronephrosis.    - Adrenals: Unremarkable.    - Pancreas: No mass or peripancreatic fat stranding.    - Retroperitoneum:  No significant adenopathy.    - Vascular: No abdominal aortic aneurysm.    - Abdominal wall:  Small fat containing umbilical hernia.    Pelvis:    Urinary bladder is within normal limits.    Bowel/Mesentery:    No evidence of bowel obstruction.  There is diverticulosis of the left colon most prominent sigmoid colon.  No evidence of acute diverticulitis.    No evidence of any intraluminal contrast extravasation or significant acute GI bleed.  Recommend clinical correlation.    Retained feces in the rectum and colon.    The appendix is within normal limits.    Bones:  No acute fractures.    Grade 1 retrolisthesis of L5 on S1.  Grade 1 spondylolisthesis of L4 on L5.  Moderate-severe disc space narrowing throughout the lumbar spine.  Moderate posterior disc osteophyte complex at L1-2 and L2-3.    Severe central canal narrowing at L1-2 with moderate central canal narrowing at L2-3 and L3-4.    Severe foraminal narrowing at L1-2 bilaterally, L2-3 bilaterally, L3-4 bilaterally, L4-5 on the right and L5-S1 bilaterally.    Facet arthropathy at L4-5 and L5-S1.    No abscess is identified.                                       X-Ray Chest AP Portable (Final result)  Result time 09/10/22 14:44:26      Final result by Marbin Moreno MD (09/10/22 14:44:26)                   Impression:      Bilateral mild diffuse nonspecific interstitial coarsening increased from prior.  Differential considerations include pulmonary edema, interstitial type pneumonia or progression of chronic interstitial lung changes.  No  consolidation.    Left costophrenic angle not well visualized which may reflect artifact versus pleural thickening/scarring versus trace pleural effusion.    Enteric tube in place with distal portion likely within the partially imaged stomach.      Electronically signed by: Marbin Moreno MD  Date:    09/10/2022  Time:    14:44               Narrative:    EXAMINATION:  XR CHEST AP PORTABLE    CLINICAL HISTORY:  Sepsis;    TECHNIQUE:  Single frontal view of the chest was performed.    COMPARISON:  Chest radiograph 04/21/2016    FINDINGS:  Patient is somewhat rotated.  Resolution is somewhat limited by body habitus with underpenetration.  Left lung apex is partially obscured by overlying chin soft tissues.  Monitoring leads overlie the chest.    Suspected enteric tube in place with distal port and tip below the diaphragm looped upon itself at the left upper quadrant likely within the stomach.  Cardiomediastinal silhouette is relatively midline and within normal limits for age allowing for magnification by chest wall and AP portable technique with patient rotation.  Pulmonary vasculature and hilar contours are grossly within normal limits.  Slight nonspecific elevation of the right hemidiaphragm.    Few scattered linear opacities throughout the lungs consistent with minimal platelike scarring versus atelectasis.  There is bilateral mild diffuse nonspecific interstitial coarsening increased from prior.  Left costophrenic angle not well visualized.  No large consolidation or definite pneumothorax.  No acute osseous process seen.  PA and lateral views can be obtained.                                        Current Medications:     Infusions:       Scheduled:   atorvastatin  10 mg Oral Daily    fluticasone furoate-vilanteroL  1 puff Inhalation Daily    insulin detemir U-100  10 Units Subcutaneous QHS    magnesium sulfate IVPB  2 g Intravenous Once    montelukast  10 mg Oral QHS    pantoprazole  40 mg Intravenous BID     tamsulosin  0.4 mg Oral Daily    vancomycin (VANCOCIN) IVPB  1,250 mg Intravenous Q12H        PRN:  acetaminophen, dextrose 10%, dextrose 10%, glucagon (human recombinant), glucose, glucose, influenza, insulin aspart U-100, naloxone, ondansetron, sodium chloride 0.9%, Pharmacy to dose Vancomycin consult **AND** vancomycin - pharmacy to dose    Antibiotics and Day Number of Therapy:  Vanc 9/10 - present  Aztreonam 9/10-9/11    Lines and Day Number of Therapy:  PIV    Assessment:     Alfa Ortiz is a 67 y.o.male with  Patient Active Problem List    Diagnosis Date Noted    Bacteremia 09/11/2022    Cellulitis of right lower extremity 09/11/2022    Coffee ground emesis 09/10/2022    Fever 09/10/2022    Sepsis 09/10/2022    Leukocytosis 09/10/2022    Elevated lactic acid level 09/10/2022    Asthmatic bronchitis with acute exacerbation 04/18/2016    BPH (benign prostatic hypertrophy) 06/22/2015    Panic disorder 05/28/2015    Dysphagia 05/28/2015    Acute UTI 05/28/2015    Hypertension 03/31/2015    Family history of early CAD 03/31/2015    Cellulitis of left leg 09/17/2013    Type 2 diabetes mellitus with diabetic polyneuropathy 08/01/2013    Hyperlipidemia 08/01/2013    Morbid obesity with BMI of 40.0-44.9, adult 08/01/2013    Elevated blood pressure 10/25/2012        Plan:       Sepsis, 2/2 ?RLE cellulitis vs. Other source  Group B Strep Bacteremia   Unclear etiology, presented with fever, WBC 18, lactic acid persistently elevated. Procal 0.69  Pt w/ reported allergy to cefdinir.   - vitals stable though watching closely  - blood cultures collected, urine clean, CXR with mild R sided infiltrate and LLL poorly visualized though poor quality film.  - CT chest chronic interstitial/fibrotic changes, 7 mm nodule in RUL  - flu and covid negative   -MRSA/SA rapid by PCR negative   - blood cultures growing Group B strep and s. Aureus; unclear source he has no recent surgery or metal.   - Continue IV vancomycin, stopped  aztreonam and flagyl 9/11  - Appreciate antibiotic recs from ID; pt reports taking augmentin in the past without allergic rxn      Right lower leg Cellulitis   - Antibiotic regimen per above bacteremia plan   - No evidence of DVT on ultrasound, patent veins  - Monitoring for changes in affected area   - Vanc should cover for strep B species    DM2  - home meds glimepiride, metformin 1000 BID  - SSI, 14u levemir qhs      Hematemesis, resolved  - presented with several episodes of vomiting followed by hematemesis, GI has seen and suspects timothy toni tear.   -PPI IV BID, type and screen  - h/h stable, repeat if bleeding.  - zofran PRN though symptoms improved at this time.   - EGD no obvious source of GI bleed, no recs at this time     Normocytic anemia  - likely from vomiting as above but needs c-scope outpt  - iron 10 and ferritin 258.   - consider f/u with GI OP for colonoscopy with repeat labs for iron panel      Asthma  Continue home breo and montelukast    7mm Right Upper Lobe Opacity  - Follow up w/ non-contrast chest CT outpatient in 6 months with PCP    CKD3 with JOSE CARLOS  - baseline Cr 1.4, now 1.8. holding lisinopril and giving fluids     HTN  Home meds: lisinopril, holding for now as above.      Diet: NPO  Code: full  PPx: holding for GIB  Dispo: resolution of sepsis.         [facility] Medicine Hospitalist Pager numbers:   [facility] Hospitalist Medicine Team A (Josh/Dennise):          [phone]  [facility] Hospitalist Medicine Team B (Linda/Clementina):        [phone]

## 2022-09-13 PROBLEM — B95.1 BACTEREMIA DUE TO GROUP B STREPTOCOCCUS: Status: ACTIVE | Noted: 2022-09-11

## 2022-09-13 PROBLEM — B95.61 MSSA BACTEREMIA: Status: ACTIVE | Noted: 2022-09-11

## 2022-09-13 LAB
ALBUMIN SERPL BCP-MCNC: 2.9 G/DL (ref 3.5–5.2)
ALP SERPL-CCNC: 66 U/L (ref 55–135)
ALT SERPL W/O P-5'-P-CCNC: 21 U/L (ref 10–44)
ANION GAP SERPL CALC-SCNC: 11 MMOL/L (ref 8–16)
AST SERPL-CCNC: 20 U/L (ref 10–40)
BACTERIA BLD CULT: ABNORMAL
BASOPHILS # BLD AUTO: 0.02 K/UL (ref 0–0.2)
BASOPHILS NFR BLD: 0.3 % (ref 0–1.9)
BILIRUB SERPL-MCNC: 0.5 MG/DL (ref 0.1–1)
BUN SERPL-MCNC: 24 MG/DL (ref 8–23)
CALCIUM SERPL-MCNC: 8.4 MG/DL (ref 8.7–10.5)
CHLORIDE SERPL-SCNC: 101 MMOL/L (ref 95–110)
CO2 SERPL-SCNC: 22 MMOL/L (ref 23–29)
CREAT SERPL-MCNC: 1.6 MG/DL (ref 0.5–1.4)
DIFFERENTIAL METHOD: ABNORMAL
EOSINOPHIL # BLD AUTO: 0.1 K/UL (ref 0–0.5)
EOSINOPHIL NFR BLD: 1.9 % (ref 0–8)
ERYTHROCYTE [DISTWIDTH] IN BLOOD BY AUTOMATED COUNT: 12.4 % (ref 11.5–14.5)
EST. GFR  (NO RACE VARIABLE): 48 ML/MIN/1.73 M^2
GLUCOSE SERPL-MCNC: 299 MG/DL (ref 70–110)
HCT VFR BLD AUTO: 33.1 % (ref 40–54)
HGB BLD-MCNC: 11.2 G/DL (ref 14–18)
IMM GRANULOCYTES # BLD AUTO: 0.02 K/UL (ref 0–0.04)
IMM GRANULOCYTES NFR BLD AUTO: 0.3 % (ref 0–0.5)
LYMPHOCYTES # BLD AUTO: 1.5 K/UL (ref 1–4.8)
LYMPHOCYTES NFR BLD: 20.2 % (ref 18–48)
MAGNESIUM SERPL-MCNC: 1.5 MG/DL (ref 1.6–2.6)
MCH RBC QN AUTO: 29.8 PG (ref 27–31)
MCHC RBC AUTO-ENTMCNC: 33.8 G/DL (ref 32–36)
MCV RBC AUTO: 88 FL (ref 82–98)
MONOCYTES # BLD AUTO: 0.8 K/UL (ref 0.3–1)
MONOCYTES NFR BLD: 11.5 % (ref 4–15)
NEUTROPHILS # BLD AUTO: 4.8 K/UL (ref 1.8–7.7)
NEUTROPHILS NFR BLD: 65.8 % (ref 38–73)
NRBC BLD-RTO: 0 /100 WBC
PHOSPHATE SERPL-MCNC: 3.3 MG/DL (ref 2.7–4.5)
PLATELET # BLD AUTO: 197 K/UL (ref 150–450)
PMV BLD AUTO: 9.6 FL (ref 9.2–12.9)
POCT GLUCOSE: 277 MG/DL (ref 70–110)
POCT GLUCOSE: 367 MG/DL (ref 70–110)
POCT GLUCOSE: 378 MG/DL (ref 70–110)
POCT GLUCOSE: 382 MG/DL (ref 70–110)
POTASSIUM SERPL-SCNC: 4.1 MMOL/L (ref 3.5–5.1)
PROT SERPL-MCNC: 6.6 G/DL (ref 6–8.4)
RBC # BLD AUTO: 3.76 M/UL (ref 4.6–6.2)
SODIUM SERPL-SCNC: 134 MMOL/L (ref 136–145)
VANCOMYCIN TROUGH SERPL-MCNC: 25.2 UG/ML (ref 10–22)
WBC # BLD AUTO: 7.22 K/UL (ref 3.9–12.7)

## 2022-09-13 PROCEDURE — 80053 COMPREHEN METABOLIC PANEL: CPT | Performed by: STUDENT IN AN ORGANIZED HEALTH CARE EDUCATION/TRAINING PROGRAM

## 2022-09-13 PROCEDURE — 94761 N-INVAS EAR/PLS OXIMETRY MLT: CPT

## 2022-09-13 PROCEDURE — 83735 ASSAY OF MAGNESIUM: CPT | Performed by: STUDENT IN AN ORGANIZED HEALTH CARE EDUCATION/TRAINING PROGRAM

## 2022-09-13 PROCEDURE — C9113 INJ PANTOPRAZOLE SODIUM, VIA: HCPCS | Performed by: STUDENT IN AN ORGANIZED HEALTH CARE EDUCATION/TRAINING PROGRAM

## 2022-09-13 PROCEDURE — 63600175 PHARM REV CODE 636 W HCPCS: Performed by: INTERNAL MEDICINE

## 2022-09-13 PROCEDURE — 36415 COLL VENOUS BLD VENIPUNCTURE: CPT | Performed by: INTERNAL MEDICINE

## 2022-09-13 PROCEDURE — 25000003 PHARM REV CODE 250: Performed by: INTERNAL MEDICINE

## 2022-09-13 PROCEDURE — 36415 COLL VENOUS BLD VENIPUNCTURE: CPT | Performed by: STUDENT IN AN ORGANIZED HEALTH CARE EDUCATION/TRAINING PROGRAM

## 2022-09-13 PROCEDURE — 63600175 PHARM REV CODE 636 W HCPCS: Performed by: STUDENT IN AN ORGANIZED HEALTH CARE EDUCATION/TRAINING PROGRAM

## 2022-09-13 PROCEDURE — 99900035 HC TECH TIME PER 15 MIN (STAT)

## 2022-09-13 PROCEDURE — 94640 AIRWAY INHALATION TREATMENT: CPT

## 2022-09-13 PROCEDURE — 80202 ASSAY OF VANCOMYCIN: CPT | Performed by: INTERNAL MEDICINE

## 2022-09-13 PROCEDURE — 84100 ASSAY OF PHOSPHORUS: CPT | Performed by: STUDENT IN AN ORGANIZED HEALTH CARE EDUCATION/TRAINING PROGRAM

## 2022-09-13 PROCEDURE — 25000003 PHARM REV CODE 250: Performed by: STUDENT IN AN ORGANIZED HEALTH CARE EDUCATION/TRAINING PROGRAM

## 2022-09-13 PROCEDURE — 85025 COMPLETE CBC W/AUTO DIFF WBC: CPT | Performed by: STUDENT IN AN ORGANIZED HEALTH CARE EDUCATION/TRAINING PROGRAM

## 2022-09-13 PROCEDURE — 11000001 HC ACUTE MED/SURG PRIVATE ROOM

## 2022-09-13 RX ORDER — PANTOPRAZOLE SODIUM 40 MG/1
40 TABLET, DELAYED RELEASE ORAL 2 TIMES DAILY
Status: DISCONTINUED | OUTPATIENT
Start: 2022-09-13 | End: 2022-09-15 | Stop reason: HOSPADM

## 2022-09-13 RX ORDER — MAGNESIUM SULFATE HEPTAHYDRATE 40 MG/ML
2 INJECTION, SOLUTION INTRAVENOUS ONCE
Status: COMPLETED | OUTPATIENT
Start: 2022-09-13 | End: 2022-09-13

## 2022-09-13 RX ORDER — INSULIN ASPART 100 [IU]/ML
1-10 INJECTION, SOLUTION INTRAVENOUS; SUBCUTANEOUS
Status: DISCONTINUED | OUTPATIENT
Start: 2022-09-13 | End: 2022-09-15 | Stop reason: HOSPADM

## 2022-09-13 RX ADMIN — VANCOMYCIN HYDROCHLORIDE 1250 MG: 1.25 INJECTION, POWDER, LYOPHILIZED, FOR SOLUTION INTRAVENOUS at 10:09

## 2022-09-13 RX ADMIN — PANTOPRAZOLE SODIUM 40 MG: 40 INJECTION, POWDER, FOR SOLUTION INTRAVENOUS at 08:09

## 2022-09-13 RX ADMIN — INSULIN ASPART 10 UNITS: 100 INJECTION, SOLUTION INTRAVENOUS; SUBCUTANEOUS at 01:09

## 2022-09-13 RX ADMIN — FLUTICASONE FUROATE AND VILANTEROL TRIFENATATE 1 PUFF: 100; 25 POWDER RESPIRATORY (INHALATION) at 08:09

## 2022-09-13 RX ADMIN — MONTELUKAST 10 MG: 10 TABLET, FILM COATED ORAL at 08:09

## 2022-09-13 RX ADMIN — TAMSULOSIN HYDROCHLORIDE 0.4 MG: 0.4 CAPSULE ORAL at 08:09

## 2022-09-13 RX ADMIN — PANTOPRAZOLE SODIUM 40 MG: 40 TABLET, DELAYED RELEASE ORAL at 08:09

## 2022-09-13 RX ADMIN — MAGNESIUM SULFATE 2 G: 2 INJECTION INTRAVENOUS at 08:09

## 2022-09-13 RX ADMIN — ATORVASTATIN CALCIUM 10 MG: 10 TABLET, FILM COATED ORAL at 08:09

## 2022-09-13 RX ADMIN — INSULIN ASPART 5 UNITS: 100 INJECTION, SOLUTION INTRAVENOUS; SUBCUTANEOUS at 08:09

## 2022-09-13 NOTE — PROGRESS NOTES
Pharmacokinetic Assessment Follow Up: IV Vancomycin    Vancomycin serum concentration assessment(s):    The trough level was drawn correctly and can be used to guide therapy at this time. The measurement is above the desired definitive target range of 15 to 20 mcg/mL.    Vancomycin Regimen Plan:    Re-dose when the random level is less than 20 mcg/mL, next level to be drawn at 0800 on 9/14    Drug levels (last 3 results):  Recent Labs   Lab Result Units 09/11/22  0155 09/11/22  1859 09/13/22  0755   Vancomycin, Random ug/mL 13.8 18.5  --    Vancomycin-Trough ug/mL  --   --  25.2*       Pharmacy will continue to follow and monitor vancomycin.    Please contact pharmacy at extension [phone] for questions regarding this assessment.    Thank you for the consult,   Jaime Dumont       Patient brief summary:  Alfa Ortiz is a 67 y.o. male initiated on antimicrobial therapy with IV Vancomycin for treatment of bacteremia    The patient's current regimen is dose by level    Drug Allergies:   Review of patient's allergies indicates:   Allergen Reactions    Omnicef [cefdinir] Hives       Actual Body Weight:   145.3kg    Renal Function:   Estimated Creatinine Clearance: 67.3 mL/min (A) (based on SCr of 1.6 mg/dL (H)).,     Dialysis Method (if applicable):      CBC (last 72 hours):  Recent Labs   Lab Result Units 09/10/22  1329 09/10/22  2112 09/11/22  0154 09/12/22  0314 09/13/22  0205   WBC K/uL 18.39*  18.39*  --  13.13* 8.67 7.22   Hemoglobin g/dL 13.0*  13.0*  --  11.6* 10.8* 11.2*   Hemoglobin A1C %  --  10.0*  --   --   --    Hematocrit % 38.5*  38.5*  --  35.0* 31.8* 33.1*   Platelets K/uL 224  224  --  187 171 197   Gran % % 83.3*  83.3*  --  82.0* 70.4 65.8   Lymph % % 6.3*  6.3*  --  8.8* 14.5* 20.2   Mono % % 9.6  9.6  --  8.4 13.4 11.5   Eosinophil % % 0.1  0.1  --  0.2 1.0 1.9   Basophil % % 0.2  0.2  --  0.2 0.2 0.3   Differential Method  Automated  Automated  --  Automated Automated Automated        Metabolic Panel (last 72 hours):  Recent Labs   Lab Result Units 09/10/22  1329 09/10/22  1723 09/11/22  0155 09/12/22  0314 09/13/22  0205   Sodium mmol/L 135*  135*  --  134* 135* 134*   Potassium mmol/L 4.9  4.9  --  4.6 4.3 4.1   Chloride mmol/L 103  103  --  103 103 101   CO2 mmol/L 21*  21*  --  21* 22* 22*   Glucose mg/dL 227*  227*  --  204* 215* 299*   Glucose, UA   --  3+*  --   --   --    BUN mg/dL 26*  26*  --  24* 23 24*   Creatinine mg/dL 1.8*  1.8*  --  1.7* 1.6* 1.6*   Albumin g/dL 3.7  3.7  --  3.0* 2.9* 2.9*   Total Bilirubin mg/dL 0.5  0.5  --  0.5 0.5 0.5   Alkaline Phosphatase U/L 76  76  --  61 61 66   AST U/L 24  24  --  18 19 20   ALT U/L 29  29  --  24 24 21   Magnesium mg/dL  --   --  1.1* 1.3* 1.5*   Phosphorus mg/dL  --   --  2.8 2.9 3.3       Vancomycin Administrations:  vancomycin given in the last 96 hours                     vancomycin 1.25 g in dextrose 5% 250 mL IVPB (ready to mix) (mg) 1,250 mg New Bag 09/13/22 1005     1,250 mg New Bag 09/12/22 2311     1,250 mg New Bag  0924     1,250 mg New Bag 09/11/22 2116    vancomycin (VANCOCIN) 2,000 mg in dextrose 5 % 500 mL IVPB (mg) 2,000 mg New Bag 09/11/22 0726    vancomycin (VANCOCIN) 2,000 mg in dextrose 5 % 500 mL IVPB (mg) 2,000 mg New Bag 09/10/22 1700                    Microbiologic Results:  Microbiology Results (last 7 days)       Procedure Component Value Units Date/Time    Blood culture x two cultures. Draw prior to antibiotics. [[id number]]  (Abnormal) Collected: 09/10/22 1329    Order Status: Completed Specimen: Blood from Peripheral, Forearm, Left Updated: 09/13/22 0813     Blood Culture, Routine Gram stain ru bottle: Gram positive cocci in clusters resembling Staph      Results called to and read back by: Jamaica Metz RN 09/11/2022  02:09      Gram stain aer bottle: Gram positive cocci in chains resembling Strep      Results called to and read back by: Janell Bueno 09/11/2022  06:21      STREPTOCOCCUS  AGALACTIAE (GROUP B)  Beta-hemolytic streptococci are routinely susceptible to   penicillins,cephalosporins and carbapenems.  For susceptibility see order #[id number]        STAPHYLOCOCCUS AUREUS  Susceptibility pending  [facility].      Narrative:      Aerobic and anaerobic    Blood culture x two cultures. Draw prior to antibiotics. [[id number]]  (Abnormal) Collected: 09/10/22 1320    Order Status: Completed Specimen: Blood from Peripheral, Hand, Right Updated: 09/13/22 0812     Blood Culture, Routine Gram stain peds bottle: Gram positive cocci      Positive results previously called 09/11/2022  03:48      STREPTOCOCCUS AGALACTIAE (GROUP B)  Susceptibility pending  Beta-hemolytic streptococci are routinely susceptible to   penicillins,cephalosporins and carbapenems.      Narrative:      Aerobic and anaerobic    Blood culture [[id number]] Collected: 09/11/22 0920    Order Status: Completed Specimen: Blood Updated: 09/12/22 1622     Blood Culture, Routine No Growth to date      No Growth to date    Blood culture [[id number]] Collected: 09/11/22 0920    Order Status: Completed Specimen: Blood Updated: 09/12/22 1622     Blood Culture, Routine No Growth to date      No Growth to date    MRSA/SA Rapid ID by PCR from Blood culture [[id number]] Collected: 09/11/22 0210    Order Status: Completed Updated: 09/11/22 0325     Staph aureus ID by PCR Negative     MRSA ID by PCR Negative    Narrative:      Aerobic and anaerobic    Influenza A & B by Molecular [[id number]] Collected: 09/10/22 1334    Order Status: Completed Specimen: Nasopharyngeal Swab Updated: 09/10/22 1434     Influenza A, Molecular Negative     Influenza B, Molecular Negative     Flu A & B Source Nasal swab

## 2022-09-13 NOTE — PROGRESS NOTES
"[facility] Medicine Progress Note    Primary Team: [facility] Hospitalist Team Team B  Attending Physician: Jeffrey Mcrae MD  Resident: Leidy  Intern: Colt    Subjective:     Pt this morning with no complaints, sleeping well. Swelling and redness to RLE is stable but not really improved. No more n/v, no fevers.     Objective:     Last 24 Hour Vital Signs:  BP  Min: 106/60  Max: 155/76  Temp  Av.7 °F (36.5 °C)  Min: 97.1 °F (36.2 °C)  Max: 98.8 °F (37.1 °C)  Pulse  Av  Min: 86  Max: 99  Resp  Av.3  Min: 16  Max: 22  SpO2  Av.9 %  Min: 92 %  Max: 99 %  Height  Av' 11" (180.3 cm)  Min: 5' 11" (180.3 cm)  Max: 5' 11" (180.3 cm)  Weight  Av.2 kg (320 lb)  Min: 145.2 kg (320 lb)  Max: 145.2 kg (320 lb)  I/O last 3 completed shifts:  In: 400 [IV Piggyback:400]  Out: -     Physical Examination:  General Appearance:    Alert, cooperative, no distress, appears stated age   Head:    Normocephalic, without obvious abnormality, atraumatic   Eyes:     conjunctiva/corneas clear, EOMI         Nose:   Nares normal, septum midline, mucosa normal, no drainage    or sinus tenderness   Throat:   Lips, mucosa, and tongue normal; teeth and gums normal   Neck:   Supple, symmetrical, trachea midline, no adenopathy;        thyroid:     Back:     Symmetric, no curvature, ROM normal, no CVA tenderness   Lungs:     Clear to auscultation bilaterally, respirations unlabored   Chest wall:    No tenderness or deformity   Heart:    Regular rate and rhythm, S1 and S2 normal, no murmur, rub   or gallop   Abdomen:     Soft, non-tender, bowel sounds active all four quadrants,     no masses, no organomegaly              Extremities:   RLE erythema, swelling, and warmth extending from ankles to below knee. Atraumatic, no cyanosis. Mild non-erythematous edema LLE.     Pulses:   2+ and symmetric all extremities   Skin:   Skin color, texture, turgor normal, no rashes or lesions   Lymph nodes:   Cervical, supraclavicular, and " axillary nodes normal   Neurologic:  Awake alert and oriented, moves all extremities. . Normal strength, sensation and reflexes       throughout       Laboratory:  Laboratory Data Reviewed: yes  Lab Results   Component Value Date    WBC 7.22 09/13/2022    HGB 11.2 (L) 09/13/2022    HCT 33.1 (L) 09/13/2022    MCV 88 09/13/2022     09/13/2022     CMP  Sodium   Date Value Ref Range Status   09/13/2022 134 (L) 136 - 145 mmol/L Final     Potassium   Date Value Ref Range Status   09/13/2022 4.1 3.5 - 5.1 mmol/L Final     Chloride   Date Value Ref Range Status   09/13/2022 101 95 - 110 mmol/L Final     CO2   Date Value Ref Range Status   09/13/2022 22 (L) 23 - 29 mmol/L Final     Glucose   Date Value Ref Range Status   09/13/2022 299 (H) 70 - 110 mg/dL Final     BUN   Date Value Ref Range Status   09/13/2022 24 (H) 8 - 23 mg/dL Final     Creatinine   Date Value Ref Range Status   09/13/2022 1.6 (H) 0.5 - 1.4 mg/dL Final     Calcium   Date Value Ref Range Status   09/13/2022 8.4 (L) 8.7 - 10.5 mg/dL Final     Total Protein   Date Value Ref Range Status   09/13/2022 6.6 6.0 - 8.4 g/dL Final     Albumin   Date Value Ref Range Status   09/13/2022 2.9 (L) 3.5 - 5.2 g/dL Final     Total Bilirubin   Date Value Ref Range Status   09/13/2022 0.5 0.1 - 1.0 mg/dL Final     Comment:     For infants and newborns, interpretation of results should be based  on gestational age, weight and in agreement with clinical  observations.    Premature Infant recommended reference ranges:  Up to 24 hours.............<8.0 mg/dL  Up to 48 hours............<12.0 mg/dL  3-5 days..................<15.0 mg/dL  6-29 days.................<15.0 mg/dL       Alkaline Phosphatase   Date Value Ref Range Status   09/13/2022 66 55 - 135 U/L Final     AST   Date Value Ref Range Status   09/13/2022 20 10 - 40 U/L Final     ALT   Date Value Ref Range Status   09/13/2022 21 10 - 44 U/L Final     Anion Gap   Date Value Ref Range Status   09/13/2022 11 8 - 16  mmol/L Final     eGFR if    Date Value Ref Range Status   11/30/2017 53.3 (A) >60 mL/min/1.73 m^2 Final     eGFR if non    Date Value Ref Range Status   11/30/2017 46.1 (A) >60 mL/min/1.73 m^2 Final     Comment:     Calculation used to obtain the estimated glomerular filtration  rate (eGFR) is the CKD-EPI equation.            Microbiology Data Reviewed: yes  Microbiology Results (last 7 days)       Procedure Component Value Units Date/Time    Blood culture [[id number]] Collected: 09/11/22 0920    Order Status: Completed Specimen: Blood Updated: 09/12/22 1622     Blood Culture, Routine No Growth to date      No Growth to date    Blood culture [[id number]] Collected: 09/11/22 0920    Order Status: Completed Specimen: Blood Updated: 09/12/22 1622     Blood Culture, Routine No Growth to date      No Growth to date    Blood culture x two cultures. Draw prior to antibiotics. [[id number]]  (Abnormal) Collected: 09/10/22 1329    Order Status: Completed Specimen: Blood from Peripheral, Forearm, Left Updated: 09/12/22 0754     Blood Culture, Routine Gram stain ru bottle: Gram positive cocci in clusters resembling Staph      Results called to and read back by: Jamaica Metz RN 09/11/2022  02:09      Gram stain aer bottle: Gram positive cocci in chains resembling Strep      Results called to and read back by: Janell Bueno 09/11/2022  06:21      STREPTOCOCCUS AGALACTIAE (GROUP B)  Beta-hemolytic streptococci are routinely susceptible to   penicillins,cephalosporins and carbapenems.  For susceptibility see order #[id number]        STAPHYLOCOCCUS AUREUS  Susceptibility pending  ID consult required at [facility] and [place] locations.      Narrative:      Aerobic and anaerobic    Blood culture x two cultures. Draw prior to antibiotics. [[id number]]  (Abnormal) Collected: 09/10/22 1320    Order Status: Completed Specimen: Blood from Peripheral, Hand, Right Updated: 09/12/22 0747     Blood  Culture, Routine Gram stain peds bottle: Gram positive cocci      Positive results previously called 09/11/2022  03:48      STREPTOCOCCUS AGALACTIAE (GROUP B)  Susceptibility pending  Beta-hemolytic streptococci are routinely susceptible to   penicillins,cephalosporins and carbapenems.      Narrative:      Aerobic and anaerobic    MRSA/SA Rapid ID by PCR from Blood culture [[id number]] Collected: 09/11/22 0210    Order Status: Completed Updated: 09/11/22 0325     Staph aureus ID by PCR Negative     MRSA ID by PCR Negative    Narrative:      Aerobic and anaerobic    Influenza A & B by Molecular [[id number]] Collected: 09/10/22 1334    Order Status: Completed Specimen: Nasopharyngeal Swab Updated: 09/10/22 1434     Influenza A, Molecular Negative     Influenza B, Molecular Negative     Flu A & B Source Nasal swab              Other Results:  EKG (my interpretation): normal EKG, normal sinus rhythm, unchanged from previous tracings.    Radiology Data Reviewed: yes  Imaging Results              CTA Acute GI [place], Abdomen and Pelvis (Final result)  Result time 09/10/22 17:24:09   Procedure changed from CT Abdomen Pelvis With Contrast     Final result by Dougie Sr MD (09/10/22 17:24:09)                   Impression:      1. No acute abnormality  2. Diverticulosis of the colon most prominent in the sigmoid colon.  No evidence of acute diverticulitis.  3. Multilevel chronic and degenerative changes of the lumbar spine.  4. Small fat containing umbilical hernia.      Electronically signed by: Dougie Sr  Date:    09/10/2022  Time:    17:24               Narrative:    EXAMINATION:  CTA ACUTE GI BLEED, ABDOMEN AND PELVIS    CLINICAL HISTORY:  GI bleed;Abdominal abscess/infection suspected;gi bleed, abscess/infection;    TECHNIQUE:  Low dose axial images, sagittal and coronal reformations were obtained from the lung bases to the pubic symphysis before and after the IV administration of 130 mL of Omnipaque 350 .  CTA  protocol.  MIPS were utilized.  Delayed phase images are submitted also.    COMPARISON:  None.    FINDINGS:  Abdomen:    - Lower thorax:Bilateral gynecomastia.    NG tube is present.    - Lung bases: No infiltrates and no nodules.    - Liver: No focal mass.    - Gallbladder: No calcified gallstones.    - Bile Ducts: No evidence of intra or extra hepatic biliary ductal dilation.    - Spleen: Negative.    - Kidneys: No mass or hydronephrosis.    - Adrenals: Unremarkable.    - Pancreas: No mass or peripancreatic fat stranding.    - Retroperitoneum:  No significant adenopathy.    - Vascular: No abdominal aortic aneurysm.    - Abdominal wall:  Small fat containing umbilical hernia.    Pelvis:    Urinary bladder is within normal limits.    Bowel/Mesentery:    No evidence of bowel obstruction.  There is diverticulosis of the left colon most prominent sigmoid colon.  No evidence of acute diverticulitis.    No evidence of any intraluminal contrast extravasation or significant acute GI bleed.  Recommend clinical correlation.    Retained feces in the rectum and colon.    The appendix is within normal limits.    Bones:  No acute fractures.    Grade 1 retrolisthesis of L5 on S1.  Grade 1 spondylolisthesis of L4 on L5.  Moderate-severe disc space narrowing throughout the lumbar spine.  Moderate posterior disc osteophyte complex at L1-2 and L2-3.    Severe central canal narrowing at L1-2 with moderate central canal narrowing at L2-3 and L3-4.    Severe foraminal narrowing at L1-2 bilaterally, L2-3 bilaterally, L3-4 bilaterally, L4-5 on the right and L5-S1 bilaterally.    Facet arthropathy at L4-5 and L5-S1.    No abscess is identified.                                       X-Ray Chest AP Portable (Final result)  Result time 09/10/22 14:44:26      Final result by Marbin Moreno MD (09/10/22 14:44:26)                   Impression:      Bilateral mild diffuse nonspecific interstitial coarsening increased from prior.  Differential  considerations include pulmonary edema, interstitial type pneumonia or progression of chronic interstitial lung changes.  No consolidation.    Left costophrenic angle not well visualized which may reflect artifact versus pleural thickening/scarring versus trace pleural effusion.    Enteric tube in place with distal portion likely within the partially imaged stomach.      Electronically signed by: Marbin Moreno MD  Date:    09/10/2022  Time:    14:44               Narrative:    EXAMINATION:  XR CHEST AP PORTABLE    CLINICAL HISTORY:  Sepsis;    TECHNIQUE:  Single frontal view of the chest was performed.    COMPARISON:  Chest radiograph 04/21/2016    FINDINGS:  Patient is somewhat rotated.  Resolution is somewhat limited by body habitus with underpenetration.  Left lung apex is partially obscured by overlying chin soft tissues.  Monitoring leads overlie the chest.    Suspected enteric tube in place with distal port and tip below the diaphragm looped upon itself at the left upper quadrant likely within the stomach.  Cardiomediastinal silhouette is relatively midline and within normal limits for age allowing for magnification by chest wall and AP portable technique with patient rotation.  Pulmonary vasculature and hilar contours are grossly within normal limits.  Slight nonspecific elevation of the right hemidiaphragm.    Few scattered linear opacities throughout the lungs consistent with minimal platelike scarring versus atelectasis.  There is bilateral mild diffuse nonspecific interstitial coarsening increased from prior.  Left costophrenic angle not well visualized.  No large consolidation or definite pneumothorax.  No acute osseous process seen.  PA and lateral views can be obtained.                                        Current Medications:     Infusions:       Scheduled:   atorvastatin  10 mg Oral Daily    fluticasone furoate-vilanteroL  1 puff Inhalation Daily    insulin detemir U-100  18 Units Subcutaneous QHS     magnesium sulfate IVPB  2 g Intravenous Once    montelukast  10 mg Oral QHS    pantoprazole  40 mg Intravenous BID    tamsulosin  0.4 mg Oral Daily    vancomycin (VANCOCIN) IVPB  1,250 mg Intravenous Q12H        PRN:  acetaminophen, dextrose 10%, glucagon (human recombinant), glucose, glucose, influenza, insulin aspart U-100, naloxone, ondansetron, sodium chloride 0.9%, Pharmacy to dose Vancomycin consult **AND** vancomycin - pharmacy to dose    Antibiotics and Day Number of Therapy:  Vanc 9/10 - present  Aztreonam 9/10-9/11    Lines and Day Number of Therapy:  PIV    Assessment:     Alfa Ortiz is a 67 y.o.male with  Patient Active Problem List    Diagnosis Date Noted    Bacteremia 09/11/2022    Cellulitis of right lower extremity 09/11/2022    Coffee ground emesis 09/10/2022    Fever 09/10/2022    Sepsis 09/10/2022    Leukocytosis 09/10/2022    Elevated lactic acid level 09/10/2022    Asthmatic bronchitis with acute exacerbation 04/18/2016    BPH (benign prostatic hypertrophy) 06/22/2015    Panic disorder 05/28/2015    Dysphagia 05/28/2015    Acute UTI 05/28/2015    Hypertension 03/31/2015    Family history of early CAD 03/31/2015    Cellulitis of left leg 09/17/2013    Type 2 diabetes mellitus with diabetic polyneuropathy 08/01/2013    Hyperlipidemia 08/01/2013    Morbid obesity with BMI of 40.0-44.9, adult 08/01/2013    Elevated blood pressure 10/25/2012        Plan:       Sepsis   Group B Strep Bacteremia   Staph bacteremia  Cellulitis   - pt with off presentation of n/v/hematemesis but was found to be septic with fever, WBC 18, lactic acid persistently elevated. Procal 0.69  - was started on vanc, aztreonam and flagyl on day of admission without clear source, day 2 he developed RLE swelling and redness consistent with cellulitis. Still unclear if this is source vs hematogenous spread from bacteremia with still unknown primary source.   - urine clean, CT chest unrevealing, flu and covid negative, TTE no  vegiation, no back pain.   - repeat cultures from 9/11 NGTD  - Continue IV vancomycin, stopped aztreonam and flagyl 9/11  - Appreciate antibiotic recs from ID; pt reports taking augmentin in the past without allergic rxn      Right lower leg Cellulitis   - Antibiotic regimen per above bacteremia plan   - No evidence of DVT on ultrasound, patent veins  - Monitoring for changes in affected area   - Vanc should cover for strep B species    DM2  - home meds glimepiride, metformin 1000 BID  - SSI, 14u levemir qhs   - reportedly is on 66 units of long acting at home with around 30 units of mealtime -a1c still in 10's, needs to follow up with endocrine.      Hematemesis, resolved  - presented with several episodes of vomiting followed by hematemesis, GI has seen and suspects timothy toni tear.   -PPI IV BID, type and screen  - h/h stable, repeat if bleeding.  - zofran PRN though symptoms improved at this time.   - EGD no obvious source of GI bleed, no recs at this time     Normocytic anemia  - likely from vomiting as above but needs c-scope outpt  - iron 10 and ferritin 258 but in setting of acute infection, needs to repeat out pt   - consider f/u with GI OP for colonoscopy with repeat labs for iron panel      Asthma  Continue home breo and montelukast    7mm Right Upper Lobe Opacity  - Follow up w/ non-contrast chest CT outpatient in 6 months with PCP    CKD3 with JOSE CARLOS  - baseline Cr 1.4, now 1.8. holding lisinopril and giving fluids     HTN  Home meds: lisinopril, holding for now as above.      Diet: diabetic   Code: full  PPx: holding for GIB  Dispo: determining abx choice/route/duration         [facility] Medicine Hospitalist Pager numbers:   U Hospitalist Medicine Team A (Josh/Dennise):          [phone]  [facility] Hospitalist Medicine Team B (Linda/Clementina):        [phone]

## 2022-09-13 NOTE — PROGRESS NOTES
Pharmacist Intervention IV to PO Note    Alfa Ortiz is a 67 y.o. male being treated with IV medication pantoprazole    Patient Data:    Vital Signs (Most Recent):  Temp: 98.2 °F (36.8 °C) (09/13/22 0731)  Pulse: 96 (09/13/22 0805)  Resp: 20 (09/13/22 0805)  BP: 136/72 (09/13/22 0731)  SpO2: 97 % (09/13/22 0805) Vital Signs (72h Range):  Temp:  [97.1 °F (36.2 °C)-102 °F (38.9 °C)]   Pulse:  []   Resp:  [16-34]   BP: (106-178)/(53-79)   SpO2:  [92 %-100 %]      CBC:  Recent Labs   Lab 09/11/22 0154 09/12/22 0314 09/13/22  0205   WBC 13.13* 8.67 7.22   RBC 3.92* 3.64* 3.76*   HGB 11.6* 10.8* 11.2*   HCT 35.0* 31.8* 33.1*    171 197   MCV 89 87 88   MCH 29.6 29.7 29.8   MCHC 33.1 34.0 33.8     CMP:     Recent Labs   Lab 09/11/22 0155 09/12/22 0314 09/13/22  0205   * 215* 299*   CALCIUM 8.4* 8.1* 8.4*   ALBUMIN 3.0* 2.9* 2.9*   PROT 6.3 5.8* 6.6   * 135* 134*   K 4.6 4.3 4.1   CO2 21* 22* 22*    103 101   BUN 24* 23 24*   CREATININE 1.7* 1.6* 1.6*   ALKPHOS 61 61 66   ALT 24 24 21   AST 18 19 20   BILITOT 0.5 0.5 0.5       Dietary Orders:  Diet Orders            Diet diabetic [facility]; 2000 Calorie: Diabetic starting at 09/12 1403            Based on the following criteria, this patient qualifies for intravenous to oral conversion:  [x] The patients gastrointestinal tract is functioning (tolerating medications via oral or enteral route for 24 hours and tolerating food or enteral feeds for 24 hours).  [x] The patient is hemodynamically stable for 24 hours (heart rate <100 beats per minute, systolic blood pressure >99 mm Hg, and respiratory rate <20 breaths per minute).  [x] The patient shows clinical improvement (afebrile for at least 24 hours and white blood cell count downtrending or normalized). Additionally, the patient must be non-neutropenic (absolute neutrophil count >500 cells/mm3).  [x] For antimicrobials, the patient has received IV therapy for at least 24  hours.    IV medication pantoprazole will be changed to oral medication     Pharmacist's Name: Jaime Dumont  Pharmacist's Extension: [phone]

## 2022-09-13 NOTE — PROGRESS NOTES
[facility] Infectious Disease Progress Note     Primary Team: MD Clementina  Consultant Attending: MD Thiago  Date of Admit: 9/10/2022     Assessment     Alfa Ortiz is a 67 y.o. male with:  1. Positive blood cultures for multiple organisms.  Workup in process.  Patient does not have obvious source of infection at this time although patient did have hematemesis.  Workup for that is in process.  Patient also had fever and white count elevation  2. Right lower extremity venous stasis/cellulitis.  Patient has warmth and some tenderness with edema.  Does have venous stasis distribution but could potentially be associated with cellulitis especially with fever prior to admission.  Could potentially explain positive blood culture. No DVT on BLE ultrasound.  3. Hematemesis.  GI consulted with no obvious source on EGD.  4. Diabetes, obesity, hypertension, hyperlipidemia, asthma      Plan     1. Continue vancomycin for the moment  2. Further workup and recommendations for antibiotic regiment including switching to linezolid to based upon further evaluation  3. Leg elevation  4. Ensure foot care with focus on prevention of dry skin, fungal infection, or further wound development.  5. Monitor patient     Thank you for your consult. We will continue to follow the patient. Please contact us if you have any additional questions.     Shruthi Rosales MD  Internal / Emergency Medicine, PGY-2  [facility]    Interval History     No acute events overnight. Reports RLE swelling and redness continue to remain stable, with improvement when he is able to elevate his leg. Sleeping well. Denies other concerns.    Medications reviewed; current antibiotics:  Vancomycin; trough high today    Review of Systems   Constitutional:  Negative for chills and fever.   Respiratory:  Negative for cough and shortness of breath.    Cardiovascular:  Positive for leg swelling. Negative for chest pain and palpitations.   Gastrointestinal:   "Negative for abdominal pain, nausea and vomiting.       Objective:   BP  Min: 106/60  Max: 155/76  Temp  Av.7 °F (36.5 °C)  Min: 97.1 °F (36.2 °C)  Max: 98.8 °F (37.1 °C)  Pulse  Av.3  Min: 86  Max: 99  Resp  Av.7  Min: 18  Max: 22  SpO2  Av.9 %  Min: 92 %  Max: 99 %  Height  Av' 11" (180.3 cm)  Min: 5' 11" (180.3 cm)  Max: 5' 11" (180.3 cm)  Weight  Av.2 kg (320 lb)  Min: 145.2 kg (320 lb)  Max: 145.2 kg (320 lb)      Physical Examination   Physical Exam  Vitals and nursing note reviewed.   Constitutional:       Appearance: He is not ill-appearing or toxic-appearing.   HENT:      Head: Normocephalic and atraumatic.      Nose: No congestion or rhinorrhea.      Mouth/Throat:      Mouth: Mucous membranes are moist.      Pharynx: Oropharynx is clear.   Eyes:      Extraocular Movements: Extraocular movements intact.      Conjunctiva/sclera: Conjunctivae normal.      Pupils: Pupils are equal, round, and reactive to light.   Cardiovascular:      Rate and Rhythm: Normal rate and regular rhythm.      Pulses: Normal pulses.      Heart sounds: Normal heart sounds. No murmur heard.    No friction rub. No gallop.   Pulmonary:      Effort: Pulmonary effort is normal.      Breath sounds: Normal breath sounds. No wheezing, rhonchi or rales.   Abdominal:      General: Bowel sounds are normal. There is no distension.      Palpations: Abdomen is soft.      Tenderness: There is no abdominal tenderness.   Musculoskeletal:      Cervical back: Normal range of motion and neck supple.      Right lower le+ Edema present.      Left lower le+ Edema present.        Feet:    Lymphadenopathy:      Cervical: No cervical adenopathy.   Skin:     General: Skin is warm.      Findings: No erythema or rash.      Comments: RLE swelling and erythema within previously marked borders between ankle and distal to knee   Neurological:      General: No focal deficit present.      Mental Status: He is alert and oriented to " person, place, and time. Mental status is at baseline.      Coordination: Coordination normal.         Laboratory Data Reviewed:  Recent Labs   Lab 09/11/22  0154 09/11/22  0155 09/12/22  0314 09/13/22  0205   WBC 13.13*  --  8.67 7.22   HGB 11.6*  --  10.8* 11.2*   HCT 35.0*  --  31.8* 33.1*     --  171 197   NA  --  134* 135* 134*   K  --  4.6 4.3 4.1   CL  --  103 103 101   CREATININE  --  1.7* 1.6* 1.6*   BUN  --  24* 23 24*   CO2  --  21* 22* 22*   ALT  --  24 24 21   AST  --  18 19 20           Microbiology Data Reviewed:  Microbiology Results (last 7 days)       Procedure Component Value Units Date/Time    Blood culture [[id number]] Collected: 09/11/22 0920    Order Status: Completed Specimen: Blood Updated: 09/12/22 1622     Blood Culture, Routine No Growth to date      No Growth to date    Blood culture [[id number]] Collected: 09/11/22 0920    Order Status: Completed Specimen: Blood Updated: 09/12/22 1622     Blood Culture, Routine No Growth to date      No Growth to date    Blood culture x two cultures. Draw prior to antibiotics. [[id number]]  (Abnormal) Collected: 09/10/22 1329    Order Status: Completed Specimen: Blood from Peripheral, Forearm, Left Updated: 09/12/22 0754     Blood Culture, Routine Gram stain ru bottle: Gram positive cocci in clusters resembling Staph      Results called to and read back by: Jamaica Metz RN 09/11/2022  02:09      Gram stain aer bottle: Gram positive cocci in chains resembling Strep      Results called to and read back by: Janell Bueno 09/11/2022  06:21      STREPTOCOCCUS AGALACTIAE (GROUP B)  Beta-hemolytic streptococci are routinely susceptible to   penicillins,cephalosporins and carbapenems.  For susceptibility see order #[id number]        STAPHYLOCOCCUS AUREUS  Susceptibility pending  ID consult required at [facility] and [facility] locations.      Narrative:      Aerobic and anaerobic    Blood culture x two cultures. Draw prior to antibiotics. [[id number]]   (Abnormal) Collected: 09/10/22 1320    Order Status: Completed Specimen: Blood from Peripheral, Hand, Right Updated: 09/12/22 0747     Blood Culture, Routine Gram stain peds bottle: Gram positive cocci      Positive results previously called 09/11/2022  03:48      STREPTOCOCCUS AGALACTIAE (GROUP B)  Susceptibility pending  Beta-hemolytic streptococci are routinely susceptible to   penicillins,cephalosporins and carbapenems.      Narrative:      Aerobic and anaerobic    MRSA/SA Rapid ID by PCR from Blood culture [[id number]] Collected: 09/11/22 0210    Order Status: Completed Updated: 09/11/22 0325     Staph aureus ID by PCR Negative     MRSA ID by PCR Negative    Narrative:      Aerobic and anaerobic    Influenza A & B by Molecular [[id number]] Collected: 09/10/22 1334    Order Status: Completed Specimen: Nasopharyngeal Swab Updated: 09/10/22 1434     Influenza A, Molecular Negative     Influenza B, Molecular Negative     Flu A & B Source Nasal swab              Current Medications   atorvastatin  10 mg Oral Daily    fluticasone furoate-vilanteroL  1 puff Inhalation Daily    insulin detemir U-100  18 Units Subcutaneous QHS    magnesium sulfate IVPB  2 g Intravenous Once    montelukast  10 mg Oral QHS    pantoprazole  40 mg Intravenous BID    tamsulosin  0.4 mg Oral Daily    vancomycin (VANCOCIN) IVPB  1,250 mg Intravenous Q12H

## 2022-09-14 PROBLEM — R78.81 BACTEREMIA DUE TO GROUP B STREPTOCOCCUS: Status: ACTIVE | Noted: 2022-09-14

## 2022-09-14 PROBLEM — B95.1 BACTEREMIA DUE TO GROUP B STREPTOCOCCUS: Status: ACTIVE | Noted: 2022-09-14

## 2022-09-14 LAB
ALBUMIN SERPL BCP-MCNC: 3 G/DL (ref 3.5–5.2)
ALP SERPL-CCNC: 75 U/L (ref 55–135)
ALT SERPL W/O P-5'-P-CCNC: 33 U/L (ref 10–44)
ANION GAP SERPL CALC-SCNC: 10 MMOL/L (ref 8–16)
AST SERPL-CCNC: 29 U/L (ref 10–40)
BASOPHILS # BLD AUTO: 0.03 K/UL (ref 0–0.2)
BASOPHILS NFR BLD: 0.5 % (ref 0–1.9)
BILIRUB SERPL-MCNC: 0.6 MG/DL (ref 0.1–1)
BUN SERPL-MCNC: 24 MG/DL (ref 8–23)
CALCIUM SERPL-MCNC: 8.7 MG/DL (ref 8.7–10.5)
CHLORIDE SERPL-SCNC: 103 MMOL/L (ref 95–110)
CO2 SERPL-SCNC: 23 MMOL/L (ref 23–29)
CREAT SERPL-MCNC: 1.6 MG/DL (ref 0.5–1.4)
DIFFERENTIAL METHOD: ABNORMAL
EOSINOPHIL # BLD AUTO: 0.2 K/UL (ref 0–0.5)
EOSINOPHIL NFR BLD: 3.1 % (ref 0–8)
ERYTHROCYTE [DISTWIDTH] IN BLOOD BY AUTOMATED COUNT: 12.3 % (ref 11.5–14.5)
EST. GFR  (NO RACE VARIABLE): 48 ML/MIN/1.73 M^2
GLUCOSE SERPL-MCNC: 286 MG/DL (ref 70–110)
HCT VFR BLD AUTO: 35.2 % (ref 40–54)
HGB BLD-MCNC: 12.2 G/DL (ref 14–18)
IMM GRANULOCYTES # BLD AUTO: 0.05 K/UL (ref 0–0.04)
IMM GRANULOCYTES NFR BLD AUTO: 0.8 % (ref 0–0.5)
LYMPHOCYTES # BLD AUTO: 1.4 K/UL (ref 1–4.8)
LYMPHOCYTES NFR BLD: 22.3 % (ref 18–48)
MCH RBC QN AUTO: 29.8 PG (ref 27–31)
MCHC RBC AUTO-ENTMCNC: 34.7 G/DL (ref 32–36)
MCV RBC AUTO: 86 FL (ref 82–98)
MONOCYTES # BLD AUTO: 0.7 K/UL (ref 0.3–1)
MONOCYTES NFR BLD: 11.2 % (ref 4–15)
NEUTROPHILS # BLD AUTO: 4 K/UL (ref 1.8–7.7)
NEUTROPHILS NFR BLD: 62.1 % (ref 38–73)
NRBC BLD-RTO: 0 /100 WBC
PLATELET # BLD AUTO: 233 K/UL (ref 150–450)
PMV BLD AUTO: 9.5 FL (ref 9.2–12.9)
POCT GLUCOSE: 272 MG/DL (ref 70–110)
POCT GLUCOSE: 369 MG/DL (ref 70–110)
POCT GLUCOSE: 382 MG/DL (ref 70–110)
POCT GLUCOSE: 399 MG/DL (ref 70–110)
POTASSIUM SERPL-SCNC: 4.3 MMOL/L (ref 3.5–5.1)
PROT SERPL-MCNC: 7.1 G/DL (ref 6–8.4)
RBC # BLD AUTO: 4.09 M/UL (ref 4.6–6.2)
SODIUM SERPL-SCNC: 136 MMOL/L (ref 136–145)
VANCOMYCIN SERPL-MCNC: 17.2 UG/ML
WBC # BLD AUTO: 6.45 K/UL (ref 3.9–12.7)

## 2022-09-14 PROCEDURE — 80202 ASSAY OF VANCOMYCIN: CPT | Performed by: INTERNAL MEDICINE

## 2022-09-14 PROCEDURE — 94760 N-INVAS EAR/PLS OXIMETRY 1: CPT

## 2022-09-14 PROCEDURE — 63600175 PHARM REV CODE 636 W HCPCS

## 2022-09-14 PROCEDURE — 80053 COMPREHEN METABOLIC PANEL: CPT | Performed by: STUDENT IN AN ORGANIZED HEALTH CARE EDUCATION/TRAINING PROGRAM

## 2022-09-14 PROCEDURE — 99900035 HC TECH TIME PER 15 MIN (STAT)

## 2022-09-14 PROCEDURE — 85025 COMPLETE CBC W/AUTO DIFF WBC: CPT | Performed by: STUDENT IN AN ORGANIZED HEALTH CARE EDUCATION/TRAINING PROGRAM

## 2022-09-14 PROCEDURE — 11000001 HC ACUTE MED/SURG PRIVATE ROOM

## 2022-09-14 PROCEDURE — 94640 AIRWAY INHALATION TREATMENT: CPT

## 2022-09-14 PROCEDURE — 25000003 PHARM REV CODE 250: Performed by: INTERNAL MEDICINE

## 2022-09-14 PROCEDURE — 25000003 PHARM REV CODE 250: Performed by: STUDENT IN AN ORGANIZED HEALTH CARE EDUCATION/TRAINING PROGRAM

## 2022-09-14 PROCEDURE — 63600175 PHARM REV CODE 636 W HCPCS: Performed by: INTERNAL MEDICINE

## 2022-09-14 RX ORDER — INSULIN ASPART 100 [IU]/ML
10 INJECTION, SOLUTION INTRAVENOUS; SUBCUTANEOUS
Status: DISCONTINUED | OUTPATIENT
Start: 2022-09-14 | End: 2022-09-15 | Stop reason: HOSPADM

## 2022-09-14 RX ORDER — LINEZOLID 600 MG/1
600 TABLET, FILM COATED ORAL EVERY 12 HOURS
Status: DISCONTINUED | OUTPATIENT
Start: 2022-09-14 | End: 2022-09-15 | Stop reason: HOSPADM

## 2022-09-14 RX ORDER — LISINOPRIL 20 MG/1
20 TABLET ORAL DAILY
Status: DISCONTINUED | OUTPATIENT
Start: 2022-09-14 | End: 2022-09-15 | Stop reason: HOSPADM

## 2022-09-14 RX ADMIN — INSULIN ASPART 6 UNITS: 100 INJECTION, SOLUTION INTRAVENOUS; SUBCUTANEOUS at 08:09

## 2022-09-14 RX ADMIN — DEXTROSE MONOHYDRATE 1250 MG: 5 INJECTION, SOLUTION INTRAVENOUS at 04:09

## 2022-09-14 RX ADMIN — PANTOPRAZOLE SODIUM 40 MG: 40 TABLET, DELAYED RELEASE ORAL at 08:09

## 2022-09-14 RX ADMIN — INSULIN ASPART 10 UNITS: 100 INJECTION, SOLUTION INTRAVENOUS; SUBCUTANEOUS at 11:09

## 2022-09-14 RX ADMIN — MONTELUKAST 10 MG: 10 TABLET, FILM COATED ORAL at 08:09

## 2022-09-14 RX ADMIN — ATORVASTATIN CALCIUM 10 MG: 10 TABLET, FILM COATED ORAL at 08:09

## 2022-09-14 RX ADMIN — LINEZOLID 600 MG: 600 TABLET, FILM COATED ORAL at 09:09

## 2022-09-14 RX ADMIN — FLUTICASONE FUROATE AND VILANTEROL TRIFENATATE 1 PUFF: 100; 25 POWDER RESPIRATORY (INHALATION) at 08:09

## 2022-09-14 RX ADMIN — TAMSULOSIN HYDROCHLORIDE 0.4 MG: 0.4 CAPSULE ORAL at 08:09

## 2022-09-14 RX ADMIN — INSULIN ASPART 5 UNITS: 100 INJECTION, SOLUTION INTRAVENOUS; SUBCUTANEOUS at 08:09

## 2022-09-14 RX ADMIN — LISINOPRIL 20 MG: 20 TABLET ORAL at 02:09

## 2022-09-14 RX ADMIN — INSULIN ASPART 10 UNITS: 100 INJECTION, SOLUTION INTRAVENOUS; SUBCUTANEOUS at 04:09

## 2022-09-14 NOTE — PROGRESS NOTES
[facility]ious Disease Progress Note     Primary Team: MD Clementina  Consultant Attending: MD Thiago  Date of Admit: 9/10/2022     Assessment     Alfa Ortiz is a 67 y.o. male with:  1. Positive blood cultures for multiple organisms.  Workup in process.  Patient does not have obvious source of infection at this time although patient did have hematemesis.  Workup for that is in process.  Patient also had fever and white count elevation  2. Right lower extremity venous stasis/cellulitis.  Patient has warmth and some tenderness with edema.  Does have venous stasis distribution but could potentially be associated with cellulitis especially with fever prior to admission.  Could potentially explain positive blood culture. No DVT on BLE ultrasound.  3. Hematemesis.  GI consulted with no obvious source on EGD.  4. Diabetes, obesity, hypertension, hyperlipidemia, asthma      Plan     1. May discontinue vancomycin and initiate linezolid PO  2. Linezolid may/may not be covered by patient insurance; please ensure patient able to obtain/afford prescription prior to discharge   3. Leg elevation  4. Ensure foot care with focus on prevention of dry skin, fungal infection, or further wound development.  5. Monitor patient     Thank you for your consult. We will continue to follow the patient. Please contact us if you have any additional questions.     Shruthi Rosales MD  Internal / Emergency Medicine, PGY-2  [facility]    Interval History     No acute events overnight. RLE swelling and redness improved with elevation, especially when he is able to keep his leg elevated. Sleeping well. Denies other concerns.    Medications reviewed; current antibiotics:  Vancomycin    Review of Systems   Constitutional:  Negative for chills and fever.   Respiratory:  Negative for cough and shortness of breath.    Cardiovascular:  Positive for leg swelling. Negative for chest pain and palpitations.   Gastrointestinal:  Negative  for abdominal pain, nausea and vomiting.       Objective:   BP  Min: 142/76  Max: 166/78  Temp  Av.5 °F (36.4 °C)  Min: 96.8 °F (36 °C)  Max: 98.1 °F (36.7 °C)  Pulse  Av.3  Min: 77  Max: 97  Resp  Av.8  Min: 16  Max: 18  SpO2  Av %  Min: 93 %  Max: 96 %      Physical Examination   Physical Exam  Vitals and nursing note reviewed.   Constitutional:       Appearance: He is not ill-appearing or toxic-appearing.   HENT:      Head: Normocephalic and atraumatic.      Nose: No congestion or rhinorrhea.      Mouth/Throat:      Mouth: Mucous membranes are moist.      Pharynx: Oropharynx is clear.   Eyes:      Extraocular Movements: Extraocular movements intact.      Conjunctiva/sclera: Conjunctivae normal.      Pupils: Pupils are equal, round, and reactive to light.   Cardiovascular:      Rate and Rhythm: Normal rate and regular rhythm.      Pulses: Normal pulses.      Heart sounds: Normal heart sounds. No murmur heard.    No friction rub. No gallop.   Pulmonary:      Effort: Pulmonary effort is normal.      Breath sounds: Normal breath sounds. No wheezing, rhonchi or rales.   Abdominal:      General: Bowel sounds are normal. There is no distension.      Palpations: Abdomen is soft.      Tenderness: There is no abdominal tenderness.   Musculoskeletal:      Cervical back: Normal range of motion and neck supple.      Right lower le+ Edema present.      Left lower le+ Edema present.        Feet:    Lymphadenopathy:      Cervical: No cervical adenopathy.   Skin:     General: Skin is warm.      Findings: No erythema or rash.      Comments: RLE swelling and erythema within previously marked borders between ankle and distal to knee; improved in comparison to previous assessment   Neurological:      General: No focal deficit present.      Mental Status: He is alert and oriented to person, place, and time. Mental status is at baseline.      Coordination: Coordination normal.         Laboratory Data  Reviewed:  Recent Labs   Lab 09/11/22  0154 09/11/22  0155 09/12/22  0314 09/13/22  0205   WBC 13.13*  --  8.67 7.22   HGB 11.6*  --  10.8* 11.2*   HCT 35.0*  --  31.8* 33.1*     --  171 197   NA  --  134* 135* 134*   K  --  4.6 4.3 4.1   CL  --  103 103 101   CREATININE  --  1.7* 1.6* 1.6*   BUN  --  24* 23 24*   CO2  --  21* 22* 22*   ALT  --  24 24 21   AST  --  18 19 20           Microbiology Data Reviewed:  Microbiology Results (last 7 days)       Procedure Component Value Units Date/Time    Blood culture [[id number]] Collected: 09/11/22 0920    Order Status: Completed Specimen: Blood Updated: 09/13/22 1622     Blood Culture, Routine No Growth to date      No Growth to date      No Growth to date    Blood culture [[id number]] Collected: 09/11/22 0920    Order Status: Completed Specimen: Blood Updated: 09/13/22 1622     Blood Culture, Routine No Growth to date      No Growth to date      No Growth to date    Blood culture x two cultures. Draw prior to antibiotics. [[id number]]  (Abnormal)  (Susceptibility) Collected: 09/10/22 1329    Order Status: Completed Specimen: Blood from Peripheral, Forearm, Left Updated: 09/13/22 1144     Blood Culture, Routine Gram stain ru bottle: Gram positive cocci in clusters resembling Staph      Results called to and read back by: Jamaica Metz RN 09/11/2022  02:09      Gram stain aer bottle: Gram positive cocci in chains resembling Strep      Results called to and read back by: Janell Bueno 09/11/2022  06:21      STREPTOCOCCUS AGALACTIAE (GROUP B)  Beta-hemolytic streptococci are routinely susceptible to   penicillins,cephalosporins and carbapenems.  For susceptibility see order #[id number]        STAPHYLOCOCCUS AUREUS  ID consult required at [facility] and [facility] locations.      Narrative:      Aerobic and anaerobic    Blood culture x two cultures. Draw prior to antibiotics. [[id number]]  (Abnormal)  (Susceptibility) Collected: 09/10/22 1320    Order Status:  Completed Specimen: Blood from Peripheral, Hand, Right Updated: 09/13/22 1133     Blood Culture, Routine Gram stain peds bottle: Gram positive cocci      Positive results previously called 09/11/2022  03:48      STREPTOCOCCUS AGALACTIAE (GROUP B)  Beta-hemolytic streptococci are routinely susceptible to   penicillins,cephalosporins and carbapenems.      Narrative:      Aerobic and anaerobic    MRSA/SA Rapid ID by PCR from Blood culture [[id number]] Collected: 09/11/22 0210    Order Status: Completed Updated: 09/11/22 0325     Staph aureus ID by PCR Negative     MRSA ID by PCR Negative    Narrative:      Aerobic and anaerobic    Influenza A & B by Molecular [[id number]] Collected: 09/10/22 1334    Order Status: Completed Specimen: Nasopharyngeal Swab Updated: 09/10/22 1434     Influenza A, Molecular Negative     Influenza B, Molecular Negative     Flu A & B Source Nasal swab              Current Medications   atorvastatin  10 mg Oral Daily    fluticasone furoate-vilanteroL  1 puff Inhalation Daily    insulin detemir U-100  20 Units Subcutaneous BID    montelukast  10 mg Oral QHS    pantoprazole  40 mg Oral BID    tamsulosin  0.4 mg Oral Daily

## 2022-09-14 NOTE — PROGRESS NOTES
[facility] Medicine Progress Note    Primary Team: [facility] Hospitalist Team Team B  Attending Physician: Jeffrey Mcrae MD  Resident: Leidy  Intern: Colt    Subjective:   Pt reports improvements overall but continues to have swelling and redness to RLE - mild improvement to stable. Denies n/v, fevers, dizziness and weakness.     Objective:     Last 24 Hour Vital Signs:  BP  Min: 140/72  Max: 166/78  Temp  Av.3 °F (36.3 °C)  Min: 96 °F (35.6 °C)  Max: 98.1 °F (36.7 °C)  Pulse  Av  Min: 77  Max: 97  Resp  Av.8  Min: 16  Max: 18  SpO2  Av.8 %  Min: 93 %  Max: 96 %  No intake/output data recorded.    Physical Examination:  General Appearance:    Alert, cooperative, no distress, appears stated age   Head:    Normocephalic, without obvious abnormality, atraumatic   Eyes:     conjunctiva/corneas clear, EOMI         Nose:   Nares normal, septum midline, mucosa normal, no drainage    or sinus tenderness   Throat:   Lips, mucosa, and tongue normal; teeth and gums normal   Neck:   Supple, symmetrical, trachea midline, no adenopathy;        thyroid:     Back:     Symmetric, no curvature, ROM normal, no CVA tenderness   Lungs:     Clear to auscultation bilaterally, respirations unlabored   Chest wall:    No tenderness or deformity   Heart:    Regular rate and rhythm, S1 and S2 normal, no murmur, rub   or gallop   Abdomen:     Soft, non-tender, bowel sounds active all four quadrants,     no masses, no organomegaly              Extremities:   RLE erythema, swelling, and warmth extending from ankles to below knee - less erythematous and swollen compared to yesterday. Atraumatic, no cyanosis. Mild non-erythematous edema LLE.     Pulses:   2+ and symmetric all extremities   Skin:   Skin color, texture, turgor normal, no rashes or lesions   Lymph nodes:   Cervical, supraclavicular, and axillary nodes normal   Neurologic:  Awake alert and oriented, moves all extremities. . Normal strength, sensation and reflexes        throughout       Laboratory:  Laboratory Data Reviewed: yes  Lab Results   Component Value Date    WBC 6.45 09/14/2022    HGB 12.2 (L) 09/14/2022    HCT 35.2 (L) 09/14/2022    MCV 86 09/14/2022     09/14/2022     CMP  Sodium   Date Value Ref Range Status   09/14/2022 136 136 - 145 mmol/L Final     Potassium   Date Value Ref Range Status   09/14/2022 4.3 3.5 - 5.1 mmol/L Final     Chloride   Date Value Ref Range Status   09/14/2022 103 95 - 110 mmol/L Final     CO2   Date Value Ref Range Status   09/14/2022 23 23 - 29 mmol/L Final     Glucose   Date Value Ref Range Status   09/14/2022 286 (H) 70 - 110 mg/dL Final     BUN   Date Value Ref Range Status   09/14/2022 24 (H) 8 - 23 mg/dL Final     Creatinine   Date Value Ref Range Status   09/14/2022 1.6 (H) 0.5 - 1.4 mg/dL Final     Calcium   Date Value Ref Range Status   09/14/2022 8.7 8.7 - 10.5 mg/dL Final     Total Protein   Date Value Ref Range Status   09/14/2022 7.1 6.0 - 8.4 g/dL Final     Albumin   Date Value Ref Range Status   09/14/2022 3.0 (L) 3.5 - 5.2 g/dL Final     Total Bilirubin   Date Value Ref Range Status   09/14/2022 0.6 0.1 - 1.0 mg/dL Final     Comment:     For infants and newborns, interpretation of results should be based  on gestational age, weight and in agreement with clinical  observations.    Premature Infant recommended reference ranges:  Up to 24 hours.............<8.0 mg/dL  Up to 48 hours............<12.0 mg/dL  3-5 days..................<15.0 mg/dL  6-29 days.................<15.0 mg/dL       Alkaline Phosphatase   Date Value Ref Range Status   09/14/2022 75 55 - 135 U/L Final     AST   Date Value Ref Range Status   09/14/2022 29 10 - 40 U/L Final     ALT   Date Value Ref Range Status   09/14/2022 33 10 - 44 U/L Final     Anion Gap   Date Value Ref Range Status   09/14/2022 10 8 - 16 mmol/L Final     eGFR if    Date Value Ref Range Status   11/30/2017 53.3 (A) >60 mL/min/1.73 m^2 Final     eGFR if non     Date Value Ref Range Status   11/30/2017 46.1 (A) >60 mL/min/1.73 m^2 Final     Comment:     Calculation used to obtain the estimated glomerular filtration  rate (eGFR) is the CKD-EPI equation.            Microbiology Data Reviewed: yes  Microbiology Results (last 7 days)       Procedure Component Value Units Date/Time    Blood culture [[id number]] Collected: 09/11/22 0920    Order Status: Completed Specimen: Blood Updated: 09/13/22 1622     Blood Culture, Routine No Growth to date      No Growth to date      No Growth to date    Blood culture [[id number]] Collected: 09/11/22 0920    Order Status: Completed Specimen: Blood Updated: 09/13/22 1622     Blood Culture, Routine No Growth to date      No Growth to date      No Growth to date    Blood culture x two cultures. Draw prior to antibiotics. [[id number]]  (Abnormal)  (Susceptibility) Collected: 09/10/22 1329    Order Status: Completed Specimen: Blood from Peripheral, Forearm, Left Updated: 09/13/22 1144     Blood Culture, Routine Gram stain ru bottle: Gram positive cocci in clusters resembling Staph      Results called to and read back by: Jamaica Metz RN 09/11/2022  02:09      Gram stain aer bottle: Gram positive cocci in chains resembling Strep      Results called to and read back by: Janell Bueno 09/11/2022  06:21      STREPTOCOCCUS AGALACTIAE (GROUP B)  Beta-hemolytic streptococci are routinely susceptible to   penicillins,cephalosporins and carbapenems.  For susceptibility see order #[id number]        STAPHYLOCOCCUS AUREUS  ID consult required at [facility],[place] and [facility].      Narrative:      Aerobic and anaerobic    Blood culture x two cultures. Draw prior to antibiotics. [[id number]]  (Abnormal)  (Susceptibility) Collected: 09/10/22 1320    Order Status: Completed Specimen: Blood from Peripheral, Hand, Right Updated: 09/13/22 1133     Blood Culture, Routine Gram stain peds bottle: Gram positive cocci      Positive results  previously called 09/11/2022  03:48      STREPTOCOCCUS AGALACTIAE (GROUP B)  Beta-hemolytic streptococci are routinely susceptible to   penicillins,cephalosporins and carbapenems.      Narrative:      Aerobic and anaerobic    MRSA/SA Rapid ID by PCR from Blood culture [[id number]] Collected: 09/11/22 0210    Order Status: Completed Updated: 09/11/22 0325     Staph aureus ID by PCR Negative     MRSA ID by PCR Negative    Narrative:      Aerobic and anaerobic    Influenza A & B by Molecular [[id number]] Collected: 09/10/22 1334    Order Status: Completed Specimen: Nasopharyngeal Swab Updated: 09/10/22 1434     Influenza A, Molecular Negative     Influenza B, Molecular Negative     Flu A & B Source Nasal swab              Other Results:  EKG (my interpretation): normal EKG, normal sinus rhythm, unchanged from previous tracings.    Radiology Data Reviewed: yes  Imaging Results              CTA Acute GI [place], Abdomen and Pelvis (Final result)  Result time 09/10/22 17:24:09   Procedure changed from CT Abdomen Pelvis With Contrast     Final result by Dougie Sr MD (09/10/22 17:24:09)                   Impression:      1. No acute abnormality  2. Diverticulosis of the colon most prominent in the sigmoid colon.  No evidence of acute diverticulitis.  3. Multilevel chronic and degenerative changes of the lumbar spine.  4. Small fat containing umbilical hernia.      Electronically signed by: Dougie Sr  Date:    09/10/2022  Time:    17:24               Narrative:    EXAMINATION:  CTA ACUTE GI BLEED, ABDOMEN AND PELVIS    CLINICAL HISTORY:  GI bleed;Abdominal abscess/infection suspected;gi bleed, abscess/infection;    TECHNIQUE:  Low dose axial images, sagittal and coronal reformations were obtained from the lung bases to the pubic symphysis before and after the IV administration of 130 mL of Omnipaque 350 .  CTA protocol.  MIPS were utilized.  Delayed phase images are submitted  also.    COMPARISON:  None.    FINDINGS:  Abdomen:    - Lower thorax:Bilateral gynecomastia.    NG tube is present.    - Lung bases: No infiltrates and no nodules.    - Liver: No focal mass.    - Gallbladder: No calcified gallstones.    - Bile Ducts: No evidence of intra or extra hepatic biliary ductal dilation.    - Spleen: Negative.    - Kidneys: No mass or hydronephrosis.    - Adrenals: Unremarkable.    - Pancreas: No mass or peripancreatic fat stranding.    - Retroperitoneum:  No significant adenopathy.    - Vascular: No abdominal aortic aneurysm.    - Abdominal wall:  Small fat containing umbilical hernia.    Pelvis:    Urinary bladder is within normal limits.    Bowel/Mesentery:    No evidence of bowel obstruction.  There is diverticulosis of the left colon most prominent sigmoid colon.  No evidence of acute diverticulitis.    No evidence of any intraluminal contrast extravasation or significant acute GI bleed.  Recommend clinical correlation.    Retained feces in the rectum and colon.    The appendix is within normal limits.    Bones:  No acute fractures.    Grade 1 retrolisthesis of L5 on S1.  Grade 1 spondylolisthesis of L4 on L5.  Moderate-severe disc space narrowing throughout the lumbar spine.  Moderate posterior disc osteophyte complex at L1-2 and L2-3.    Severe central canal narrowing at L1-2 with moderate central canal narrowing at L2-3 and L3-4.    Severe foraminal narrowing at L1-2 bilaterally, L2-3 bilaterally, L3-4 bilaterally, L4-5 on the right and L5-S1 bilaterally.    Facet arthropathy at L4-5 and L5-S1.    No abscess is identified.                                       X-Ray Chest AP Portable (Final result)  Result time 09/10/22 14:44:26      Final result by Marbin Moreno MD (09/10/22 14:44:26)                   Impression:      Bilateral mild diffuse nonspecific interstitial coarsening increased from prior.  Differential considerations include pulmonary edema, interstitial type pneumonia  or progression of chronic interstitial lung changes.  No consolidation.    Left costophrenic angle not well visualized which may reflect artifact versus pleural thickening/scarring versus trace pleural effusion.    Enteric tube in place with distal portion likely within the partially imaged stomach.      Electronically signed by: Marbin Moreno MD  Date:    09/10/2022  Time:    14:44               Narrative:    EXAMINATION:  XR CHEST AP PORTABLE    CLINICAL HISTORY:  Sepsis;    TECHNIQUE:  Single frontal view of the chest was performed.    COMPARISON:  Chest radiograph 04/21/2016    FINDINGS:  Patient is somewhat rotated.  Resolution is somewhat limited by body habitus with underpenetration.  Left lung apex is partially obscured by overlying chin soft tissues.  Monitoring leads overlie the chest.    Suspected enteric tube in place with distal port and tip below the diaphragm looped upon itself at the left upper quadrant likely within the stomach.  Cardiomediastinal silhouette is relatively midline and within normal limits for age allowing for magnification by chest wall and AP portable technique with patient rotation.  Pulmonary vasculature and hilar contours are grossly within normal limits.  Slight nonspecific elevation of the right hemidiaphragm.    Few scattered linear opacities throughout the lungs consistent with minimal platelike scarring versus atelectasis.  There is bilateral mild diffuse nonspecific interstitial coarsening increased from prior.  Left costophrenic angle not well visualized.  No large consolidation or definite pneumothorax.  No acute osseous process seen.  PA and lateral views can be obtained.                                        Current Medications:     Infusions:       Scheduled:   atorvastatin  10 mg Oral Daily    fluticasone furoate-vilanteroL  1 puff Inhalation Daily    insulin detemir U-100  20 Units Subcutaneous BID    montelukast  10 mg Oral QHS    pantoprazole  40 mg Oral BID     tamsulosin  0.4 mg Oral Daily        PRN:  acetaminophen, dextrose 10%, glucagon (human recombinant), glucose, glucose, influenza, insulin aspart U-100, naloxone, ondansetron, sodium chloride 0.9%, Pharmacy to dose Vancomycin consult **AND** vancomycin - pharmacy to dose    Antibiotics and Day Number of Therapy:  Vanc 9/10 - present  Aztreonam 9/10-9/11    Lines and Day Number of Therapy:  PIV    Assessment:     Alfa Ortiz is a 67 y.o.male with  Patient Active Problem List    Diagnosis Date Noted    MSSA bacteremia 09/11/2022    Cellulitis of right lower extremity 09/11/2022    Coffee ground emesis 09/10/2022    Fever 09/10/2022    Sepsis 09/10/2022    Leukocytosis 09/10/2022    Elevated lactic acid level 09/10/2022    Asthmatic bronchitis with acute exacerbation 04/18/2016    BPH (benign prostatic hypertrophy) 06/22/2015    Panic disorder 05/28/2015    Dysphagia 05/28/2015    Acute UTI 05/28/2015    Hypertension 03/31/2015    Family history of early CAD 03/31/2015    Cellulitis of left leg 09/17/2013    Type 2 diabetes mellitus with diabetic polyneuropathy 08/01/2013    Hyperlipidemia 08/01/2013    Morbid obesity with BMI of 40.0-44.9, adult 08/01/2013    Elevated blood pressure 10/25/2012        Plan:       Sepsis   Group B Strep Bacteremia   Staph bacteremia  Cellulitis   - pt with off presentation of n/v/hematemesis but was found to be septic with fever, WBC 18, lactic acid persistently elevated. Procal 0.69  - was started on vanc, aztreonam and flagyl on day of admission without clear source, day 2 he developed RLE swelling and redness consistent with cellulitis. Still unclear if this is source vs hematogenous spread from bacteremia with still unknown primary source.   - urine clean, CT chest unrevealing, flu and covid negative, TTE no vegiation, no back pain.   - repeat cultures from 9/11 NGTD  - Continue IV vancomycin, stopped aztreonam and flagyl 9/11  - ID following closely  - Plan to switch to PO  antibiotics after further improvement in cellulitis       Right lower leg Cellulitis   - Antibiotic regimen per above bacteremia plan   - No evidence of DVT on ultrasound, patent veins  - Monitoring for changes in affected area   - Strep B susceptibility - sensitive to vancomycin     DM2  - home meds glimepiride, metformin 1000 BID  - SSI, 14u levemir qhs   - reportedly is on 66 units of long acting at home with around 30 units of mealtime -a1c still in 10's, needs to follow up with endocrine. Levemir 20 BID today      Hematemesis, resolved  - presented with several episodes of vomiting followed by hematemesis, GI has seen and suspects timothy toni tear.   -PPI IV BID, type and screen  - h/h stable, repeat if bleeding.  - zofran PRN though symptoms improved at this time.   - EGD no obvious source of GI bleed, no recs at this time     Normocytic anemia  - likely from vomiting as above but needs c-scope outpt  - iron 10 and ferritin 258 but in setting of acute infection, needs to repeat out pt   - consider f/u with GI OP for colonoscopy with repeat labs for iron panel      Asthma  Continue home breo and montelukast    7mm Right Upper Lobe Opacity  - Follow up w/ non-contrast chest CT outpatient in 6 months with PCP    CKD3 with [facility]  - baseline Cr 1.4, at highest 1.8, resuming lisinopril      HTN  Home meds: lisinopril, resuming today      Diet: diabetic   Code: full  PPx: holding for GIB  Dispo: determining abx choice/route/duration         [facility] Medicine Hospitalist Pager numbers:   [facility] Hospitalist Medicine Team A (Josh/Dennise):          [phone]  [facility] Hospitalist Medicine Team B (Linda/Clementina):        [phone]

## 2022-09-14 NOTE — PROGRESS NOTES
Pharmacokinetic Assessment Follow Up: IV Vancomycin    Vancomycin serum concentration assessment(s):    The random level was drawn correctly and can be used to guide therapy at this time. The measurement is within the desired definitive target range of 15 to 20 mcg/mL.Level resulted delayed since it was a lab send out.    Vancomycin Regimen Plan:    Change regimen to Vancomycin 1250 mg IV every 24 hours with next serum trough concentration measured at 1500 prior to 3rd dose on 9/16    Drug levels (last 3 results):  Recent Labs   Lab Result Units 09/11/22  1859 09/13/22 0755 09/14/22  0759   Vancomycin, Random ug/mL 18.5  --  17.2   Vancomycin-Trough ug/mL  --  25.2*  --        Pharmacy will continue to follow and monitor vancomycin.    Please contact pharmacy at extension [phone] for questions regarding this assessment.    Thank you for the consult,   Jaime Dumont       Patient brief summary:  Alfa Ortiz is a 67 y.o. male initiated on antimicrobial therapy with IV Vancomycin for treatment of bacteremia    The patient's current regimen is vancomycin 1.25gram IV q24h    Drug Allergies:   Review of patient's allergies indicates:   Allergen Reactions    Omnicef [cefdinir] Hives       Actual Body Weight:   145.2kg    Renal Function:   Estimated Creatinine Clearance: 67.3 mL/min (A) (based on SCr of 1.6 mg/dL (H)).,     Dialysis Method (if applicable):      CBC (last 72 hours):  Recent Labs   Lab Result Units 09/12/22 0314 09/13/22 0205 09/14/22  0759   WBC K/uL 8.67 7.22 6.45   Hemoglobin g/dL 10.8* 11.2* 12.2*   Hematocrit % 31.8* 33.1* 35.2*   Platelets K/uL 171 197 233   Gran % % 70.4 65.8 62.1   Lymph % % 14.5* 20.2 22.3   Mono % % 13.4 11.5 11.2   Eosinophil % % 1.0 1.9 3.1   Basophil % % 0.2 0.3 0.5   Differential Method  Automated Automated Automated       Metabolic Panel (last 72 hours):  Recent Labs   Lab Result Units 09/12/22 0314 09/13/22  0205 09/14/22  0759   Sodium mmol/L 135* 134* 136   Potassium mmol/L  4.3 4.1 4.3   Chloride mmol/L 103 101 103   CO2 mmol/L 22* 22* 23   Glucose mg/dL 215* 299* 286*   BUN mg/dL 23 24* 24*   Creatinine mg/dL 1.6* 1.6* 1.6*   Albumin g/dL 2.9* 2.9* 3.0*   Total Bilirubin mg/dL 0.5 0.5 0.6   Alkaline Phosphatase U/L 61 66 75   AST U/L 19 20 29   ALT U/L 24 21 33   Magnesium mg/dL 1.3* 1.5*  --    Phosphorus mg/dL 2.9 3.3  --        Vancomycin Administrations:  vancomycin given in the last 96 hours                     vancomycin 1.25 g in dextrose 5% 250 mL IVPB (ready to mix) (mg) 1,250 mg New Bag 09/13/22 1005     1,250 mg New Bag 09/12/22 2311     1,250 mg New Bag  0924     1,250 mg New Bag 09/11/22 2116    vancomycin (VANCOCIN) 2,000 mg in dextrose 5 % 500 mL IVPB (mg) 2,000 mg New Bag 09/11/22 0726    vancomycin (VANCOCIN) 2,000 mg in dextrose 5 % 500 mL IVPB (mg) 2,000 mg New Bag 09/10/22 1700                    Microbiologic Results:  Microbiology Results (last 7 days)       Procedure Component Value Units Date/Time    Blood culture [[id number]] Collected: 09/11/22 0920    Order Status: Completed Specimen: Blood Updated: 09/13/22 1622     Blood Culture, Routine No Growth to date      No Growth to date      No Growth to date    Blood culture [[id number]] Collected: 09/11/22 0920    Order Status: Completed Specimen: Blood Updated: 09/13/22 1622     Blood Culture, Routine No Growth to date      No Growth to date      No Growth to date    Blood culture x two cultures. Draw prior to antibiotics. [[id number]]  (Abnormal)  (Susceptibility) Collected: 09/10/22 1329    Order Status: Completed Specimen: Blood from Peripheral, Forearm, Left Updated: 09/13/22 1144     Blood Culture, Routine Gram stain ru bottle: Gram positive cocci in clusters resembling Staph      Results called to and read back by: Jamaica Metz RN 09/11/2022  02:09      Gram stain aer bottle: Gram positive cocci in chains resembling Strep      Results called to and read back by: Janell Bueno 09/11/2022  06:21       STREPTOCOCCUS AGALACTIAE (GROUP B)  Beta-hemolytic streptococci are routinely susceptible to   penicillins,cephalosporins and carbapenems.  For susceptibility see order #[id number]        STAPHYLOCOCCUS AUREUS  ID consult required at [facility],[place] and [facility] locations.      Narrative:      Aerobic and anaerobic    Blood culture x two cultures. Draw prior to antibiotics. [[id number]]  (Abnormal)  (Susceptibility) Collected: 09/10/22 1320    Order Status: Completed Specimen: Blood from Peripheral, Hand, Right Updated: 09/13/22 1133     Blood Culture, Routine Gram stain peds bottle: Gram positive cocci      Positive results previously called 09/11/2022  03:48      STREPTOCOCCUS AGALACTIAE (GROUP B)  Beta-hemolytic streptococci are routinely susceptible to   penicillins,cephalosporins and carbapenems.      Narrative:      Aerobic and anaerobic    MRSA/SA Rapid ID by PCR from Blood culture [[id number]] Collected: 09/11/22 0210    Order Status: Completed Updated: 09/11/22 0325     Staph aureus ID by PCR Negative     MRSA ID by PCR Negative    Narrative:      Aerobic and anaerobic    Influenza A & B by Molecular [[id number]] Collected: 09/10/22 1334    Order Status: Completed Specimen: Nasopharyngeal Swab Updated: 09/10/22 1434     Influenza A, Molecular Negative     Influenza B, Molecular Negative     Flu A & B Source Nasal swab

## 2022-09-14 NOTE — PLAN OF CARE
JENIFER met with pt and pts wife via [employer] to discuss dc planning.     Per MD possible dc tomorrow. Pt expressed understanding and that he has spoken with MD and they have expressed the same. SW expressed we are still pending IV abx vs. PO abx upon dc. Pt expressed understanding. [facility] is following incase pt dc with IV abx.     SW will continue to follow pt throughout his transitions of care and assist with any dc needs.     Future Appointments   Date Time Provider Department Center   9/21/2022  9:30 AM Olesya Felix MD [facility]        09/14/22 1103   Post-Acute Status   Post-Acute Authorization Other

## 2022-09-15 ENCOUNTER — TELEPHONE (OUTPATIENT)
Dept: PRIMARY CARE CLINIC | Facility: CLINIC | Age: 67
End: 2022-09-15
Payer: MEDICARE

## 2022-09-15 VITALS
HEART RATE: 87 BPM | WEIGHT: 315 LBS | DIASTOLIC BLOOD PRESSURE: 77 MMHG | BODY MASS INDEX: 44.1 KG/M2 | RESPIRATION RATE: 18 BRPM | HEIGHT: 71 IN | TEMPERATURE: 98 F | OXYGEN SATURATION: 94 % | SYSTOLIC BLOOD PRESSURE: 162 MMHG

## 2022-09-15 LAB
ANION GAP SERPL CALC-SCNC: 11 MMOL/L (ref 8–16)
BASOPHILS # BLD AUTO: 0.03 K/UL (ref 0–0.2)
BASOPHILS NFR BLD: 0.5 % (ref 0–1.9)
BUN SERPL-MCNC: 24 MG/DL (ref 8–23)
CALCIUM SERPL-MCNC: 8.6 MG/DL (ref 8.7–10.5)
CHLORIDE SERPL-SCNC: 102 MMOL/L (ref 95–110)
CO2 SERPL-SCNC: 23 MMOL/L (ref 23–29)
CREAT SERPL-MCNC: 1.7 MG/DL (ref 0.5–1.4)
DIFFERENTIAL METHOD: ABNORMAL
EOSINOPHIL # BLD AUTO: 0.2 K/UL (ref 0–0.5)
EOSINOPHIL NFR BLD: 3.4 % (ref 0–8)
ERYTHROCYTE [DISTWIDTH] IN BLOOD BY AUTOMATED COUNT: 12.2 % (ref 11.5–14.5)
EST. GFR  (NO RACE VARIABLE): 44 ML/MIN/1.73 M^2
GLUCOSE SERPL-MCNC: 306 MG/DL (ref 70–110)
HCT VFR BLD AUTO: 35.7 % (ref 40–54)
HGB BLD-MCNC: 12.2 G/DL (ref 14–18)
IMM GRANULOCYTES # BLD AUTO: 0.07 K/UL (ref 0–0.04)
IMM GRANULOCYTES NFR BLD AUTO: 1.1 % (ref 0–0.5)
LYMPHOCYTES # BLD AUTO: 1.6 K/UL (ref 1–4.8)
LYMPHOCYTES NFR BLD: 25.7 % (ref 18–48)
MCH RBC QN AUTO: 29.5 PG (ref 27–31)
MCHC RBC AUTO-ENTMCNC: 34.2 G/DL (ref 32–36)
MCV RBC AUTO: 86 FL (ref 82–98)
MONOCYTES # BLD AUTO: 0.7 K/UL (ref 0.3–1)
MONOCYTES NFR BLD: 10.5 % (ref 4–15)
NEUTROPHILS # BLD AUTO: 3.8 K/UL (ref 1.8–7.7)
NEUTROPHILS NFR BLD: 58.8 % (ref 38–73)
NRBC BLD-RTO: 0 /100 WBC
PLATELET # BLD AUTO: 263 K/UL (ref 150–450)
PMV BLD AUTO: 9.4 FL (ref 9.2–12.9)
POCT GLUCOSE: 310 MG/DL (ref 70–110)
POCT GLUCOSE: 376 MG/DL (ref 70–110)
POTASSIUM SERPL-SCNC: 4.6 MMOL/L (ref 3.5–5.1)
RBC # BLD AUTO: 4.13 M/UL (ref 4.6–6.2)
SODIUM SERPL-SCNC: 136 MMOL/L (ref 136–145)
WBC # BLD AUTO: 6.39 K/UL (ref 3.9–12.7)

## 2022-09-15 PROCEDURE — 85025 COMPLETE CBC W/AUTO DIFF WBC: CPT

## 2022-09-15 PROCEDURE — 25000003 PHARM REV CODE 250: Performed by: INTERNAL MEDICINE

## 2022-09-15 PROCEDURE — 80048 BASIC METABOLIC PNL TOTAL CA: CPT

## 2022-09-15 PROCEDURE — 94640 AIRWAY INHALATION TREATMENT: CPT

## 2022-09-15 PROCEDURE — 25000003 PHARM REV CODE 250: Performed by: STUDENT IN AN ORGANIZED HEALTH CARE EDUCATION/TRAINING PROGRAM

## 2022-09-15 PROCEDURE — 36415 COLL VENOUS BLD VENIPUNCTURE: CPT

## 2022-09-15 PROCEDURE — 99900035 HC TECH TIME PER 15 MIN (STAT)

## 2022-09-15 PROCEDURE — 94761 N-INVAS EAR/PLS OXIMETRY MLT: CPT

## 2022-09-15 RX ORDER — PANTOPRAZOLE SODIUM 40 MG/1
40 TABLET, DELAYED RELEASE ORAL 2 TIMES DAILY
Qty: 60 TABLET | Refills: 11 | Status: SHIPPED | OUTPATIENT
Start: 2022-09-15 | End: 2023-09-15

## 2022-09-15 RX ORDER — LINEZOLID 600 MG/1
600 TABLET, FILM COATED ORAL EVERY 12 HOURS
Qty: 24 TABLET | Refills: 0 | Status: SHIPPED | OUTPATIENT
Start: 2022-09-15 | End: 2022-09-27

## 2022-09-15 RX ADMIN — INSULIN ASPART 10 UNITS: 100 INJECTION, SOLUTION INTRAVENOUS; SUBCUTANEOUS at 11:09

## 2022-09-15 RX ADMIN — INSULIN ASPART 10 UNITS: 100 INJECTION, SOLUTION INTRAVENOUS; SUBCUTANEOUS at 08:09

## 2022-09-15 RX ADMIN — TAMSULOSIN HYDROCHLORIDE 0.4 MG: 0.4 CAPSULE ORAL at 08:09

## 2022-09-15 RX ADMIN — PANTOPRAZOLE SODIUM 40 MG: 40 TABLET, DELAYED RELEASE ORAL at 08:09

## 2022-09-15 RX ADMIN — LINEZOLID 600 MG: 600 TABLET, FILM COATED ORAL at 08:09

## 2022-09-15 RX ADMIN — LISINOPRIL 20 MG: 20 TABLET ORAL at 08:09

## 2022-09-15 RX ADMIN — ATORVASTATIN CALCIUM 10 MG: 10 TABLET, FILM COATED ORAL at 08:09

## 2022-09-15 RX ADMIN — FLUTICASONE FUROATE AND VILANTEROL TRIFENATATE 1 PUFF: 100; 25 POWDER RESPIRATORY (INHALATION) at 09:09

## 2022-09-15 NOTE — PLAN OF CARE
Discharge orders noted. Additional clinical references attached. Patient's discharge instructions given by bedside RN and reviewed by this VN with patient. Wife at bedside. Education provided on home care instructions, new and previous medications, diagnosis, when to seek medical attention, and follow-up appointments. New medications delivered by pharmacy. Patient verbalized understanding and teach back method was used. Patient's wife to provide ride/transportation home. All questions answered. Floor nurse notified.

## 2022-09-15 NOTE — PROGRESS NOTES
[facility] ID note    Patient afebrile.  WBC 6.4.  Patient on linezolid now.    Pt going home today    Continue linezolid for 10 more days of therapy.    Please call if any questions   Waqar Das  [facility] ID  [phone]

## 2022-09-15 NOTE — TELEPHONE ENCOUNTER
[facility] RN met with patient and pt's spouse regarding [facility] follow up upon discharge. Pt agreeable to hospital follow up .RN informed pt of scheduled appointment and that appointment will also appear on d/c AVS. Patient informed to bring portable home O2 if used and all medication bottles to [facility] follow up appointment.  [facility] information handout, appointment card and folder provided to patient. Pt states  will provide transportation to appointment.    Patient Contact info:[phone]    Person providing transportation contact info: [phone] (Mobile)    Barriers to attending [facility] visit: none    Future Appointments   Date Time Provider Department Center   9/21/2022  9:30 AM Olesya Felix MD [facility] [place]

## 2022-09-15 NOTE — PLAN OF CARE
Per MD pt should dc today. Pt will dc with no needs noted. SW met with pt via [employer] to discuss dc planning. Pts wife is at bedside. Pt wife will transport pt home upon dc. Pt declined  services as he will not dc with IV abx. SW contacted HH agency and expressed no need for services. SW contacted [facility] for IV abx and expressed no need of services. SW will continue to follow pt throughout his transitions of care and assist with any dc needs.     Cleared from  . Bedside Nurse and VN notified.    Future Appointments   Date Time Provider Department Center   9/21/2022  9:30 AM Olesya Felix MD [facility]        09/15/22 1202   Final Note   Assessment Type Final Discharge Note   Anticipated Discharge Disposition Home   Hospital Resources/Appts/Education Provided Appointments scheduled by Navigator/Coordinator   Post-Acute Status   Discharge Delays None known at this time

## 2022-09-15 NOTE — PROGRESS NOTES
"[facility]                    Pharmacy       Discharge Medication Education    Patient ACCEPTED medication education. Pharmacy has provided education on the name, indication, and possible side effects of the medication(s) prescribed, using teach-back method.     The following medications have also been discussed, during this admission.        Medication List        START taking these medications      linezolid 600 mg Tab  Commonly known as: ZYVOX  Take 1 tablet (600 mg total) by mouth every 12 (twelve) hours. for 12 days     pantoprazole 40 MG tablet  Commonly known as: PROTONIX  Take 1 tablet (40 mg total) by mouth 2 (two) times daily.            CHANGE how you take these medications      glimepiride 2 MG tablet  Commonly known as: AMARYL  TAKE ONE TABLET BY MOUTH BEFORE BREAKFAST ONCE DAILY  What changed: See the new instructions.     lisinopriL 40 MG tablet  Commonly known as: PRINIVIL,ZESTRIL  TAKE ONE-HALF TABLET BY MOUTH TWICE DAILY  What changed:   how much to take  how to take this  when to take this            CONTINUE taking these medications      amLODIPine 10 MG tablet  Commonly known as: NORVASC     aspirin 81 MG EC tablet  Commonly known as: ECOTRIN     atorvastatin 10 MG tablet  Commonly known as: LIPITOR  Take 1 tablet (10 mg total) by mouth once daily.     BREO ELLIPTA 100-25 mcg/dose diskus inhaler  Generic drug: fluticasone furoate-vilanteroL     metFORMIN 1000 MG tablet  Commonly known as: GLUCOPHAGE  Take 1 tablet (1,000 mg total) by mouth 2 (two) times daily.     montelukast 10 mg tablet  Commonly known as: SINGULAIR     NovoLOG Flexpen U-100 Insulin 100 unit/mL (3 mL) Inpn pen  Generic drug: insulin aspart U-100     ONE DAILY MULTIVITAMIN per tablet  Generic drug: multivitamin     pen needle, diabetic 31 gauge x 5/16" Ndle  Commonly known as: PEN NEEDLE  relion pen needle 31g/ 8mm misc twice daily     PROAIR HFA 90 mcg/actuation inhaler  Generic drug: albuterol     TOUJEO " MAX U-300 SOLOSTAR 300 unit/mL (3 mL) insulin pen  Generic drug: insulin glargine U-300 conc               Where to Get Your Medications        These medications were sent to [facility] [address], [place] [address]      Hours: Mon-Fri, 8a-5:30p Phone: [phone]   linezolid 600 mg Tab  pantoprazole 40 MG tablet          Thank you  Yonis Dumont, PharmD

## 2022-09-15 NOTE — DISCHARGE SUMMARY
[facility] Discharge Summary    Primary Team: [facility] Team B  Attending Physician: Dr. Ha  Resident: Dr. Forbes  Intern: Dr. Gregory    Date of Admit: 9/10/2022  Date of Discharge: 9/15/2022    Discharge to: Home  Condition: Good    Discharge Diagnoses     Patient Active Problem List   Diagnosis    Elevated blood pressure    Type 2 diabetes mellitus with diabetic polyneuropathy    Hyperlipidemia    Morbid obesity with BMI of 40.0-44.9, adult    Cellulitis of left leg    Hypertension    Family history of early CAD    Panic disorder    Dysphagia    Acute UTI    BPH (benign prostatic hypertrophy)    Asthmatic bronchitis with acute exacerbation    Coffee ground emesis    Fever    Sepsis    Leukocytosis    Elevated lactic acid level    MSSA bacteremia    Cellulitis of right lower extremity    Bacteremia due to group B Streptococcus        Consultants and Procedures     Consultants:  Infectious disease, , social work, gastroenterology, respiratory therapist    Procedures:   EGD    Brief History of Present Illness      Alfa Ortiz is a 67 y.o. male w/ h/o Diabetes mellitus type II on insulin (not controlled), Hyperlipidemia, Obesity, and essential hypertension who presented to [facility] on 9/10/2022 with a primary complaint of hematemesis for one day. After many episodes emesis and an empty stomach he started to have blood in vomit which prompted him to present to ED. Had abdominal pain only when vomiting, no diarrhea, no cough, SOB, CP, pain with urination. Fever 102 around the time vomiting started. Also found to have fever in ED though no other symptoms reported. Found to be septic w/ group B strep and s. Aureus bacteremia. Found to have cellulitis of RLE.     For the full HPI please refer to the History & Physical from this admission.    Hospital Course By Problem with Pertinent Findings     Sepsis, Group B Strep Bacteremia, Staph bacteremia  Found to be septic with fever, elevated WBC  to 18, persistently elevated lactic acid, and elevated procal at admission without clear source of infection. No back pain, TTE negative, urine clean, and CT chest unrevealing of acute processes. Flu and covid also negative. Pt was started on vanc, aztreonam and flagyl on day of admission for sepsis with unknown source in consideration of the patient's reported allergy to cefdinir. On day 2 of admission, pt developed right lower leg swelling and redness consistent with cellulitis. U/S performed to rule out deep vein thrombosis. Blood culture grew Group B strep and s. Aureus. Infectious disease consulted for antibiotic recommendations. Aztreonam and flagyl were stopped. Pt continued IV vancomycin with resolution of fever, leukocytosis and clinical symptoms except for cellulitis. Patient was switched to PO linezolid to continue antibiotic regimen at home for cellulitis. At  discharge, repeat blood cultures show no evidence of bacterial growth and pt feels better with much improved cellulitis.      Right lower leg cellulitis  Pt developed right lower leg swelling and redness consistent with cellulitis. U/S performed to rule out deep vein thrombosis. Treated with IV vancomycin due to pt's allergy to cephalosporin antibiotic in the past and positive blood cultures for Group B strep and s. Aureus. Switched to oral linezolid for home coverage of remaining cellulitis.     Hematemesis, normocytic anemia   Pt presented with several episodes of vomiting followed by hematemesis. GI was consulted and performed EGD to evaluate for source of GI bleed w/ no obvious source of vleeding found. IV proton pump inhibitor given. Suspect hematemesis due to timothy wise tear from emesis. At discharge, hematemesis, emesis, and nausea have resolved. Pt recommended to follow up with GI outpatient for possible colonoscopy and repeat iron panel.       Type II diabetes on insulin, uncontrolled   Hemoglobin a1c 10 during admission. Pt given  "scheduled insulin and sliding scale insulin. Pt recommended to follow up with his outpatient PCP and endocrinologist for persistently elevated glucose / hgba1c.     Asthma  Patient given breo and montelukast per his outpatient medications.     7mm Right Upper Lobe Opacity  7 mm RUL lobe opacity found incidentally on CT chest. Pt recommended to follow up with PCP for a repeat CT in 6 months to monitor for changes.          Discharge Medications        Medication List        START taking these medications      linezolid 600 mg Tab  Commonly known as: ZYVOX  Take 1 tablet (600 mg total) by mouth every 12 (twelve) hours. for 12 days     pantoprazole 40 MG tablet  Commonly known as: PROTONIX  Take 1 tablet (40 mg total) by mouth 2 (two) times daily.            CHANGE how you take these medications      glimepiride 2 MG tablet  Commonly known as: AMARYL  TAKE ONE TABLET BY MOUTH BEFORE BREAKFAST ONCE DAILY  What changed: See the new instructions.     lisinopriL 40 MG tablet  Commonly known as: PRINIVIL,ZESTRIL  TAKE ONE-HALF TABLET BY MOUTH TWICE DAILY  What changed:   how much to take  how to take this  when to take this            CONTINUE taking these medications      amLODIPine 10 MG tablet  Commonly known as: NORVASC     aspirin 81 MG EC tablet  Commonly known as: ECOTRIN     atorvastatin 10 MG tablet  Commonly known as: LIPITOR  Take 1 tablet (10 mg total) by mouth once daily.     BREO ELLIPTA 100-25 mcg/dose diskus inhaler  Generic drug: fluticasone furoate-vilanteroL     metFORMIN 1000 MG tablet  Commonly known as: GLUCOPHAGE  Take 1 tablet (1,000 mg total) by mouth 2 (two) times daily.     montelukast 10 mg tablet  Commonly known as: SINGULAIR     NovoLOG Flexpen U-100 Insulin 100 unit/mL (3 mL) Inpn pen  Generic drug: insulin aspart U-100     ONE DAILY MULTIVITAMIN per tablet  Generic drug: multivitamin     pen needle, diabetic 31 gauge x 5/16" Ndle  Commonly known as: PEN NEEDLE  relion pen needle 31g/ 8mm misc " twice daily     PROAIR HFA 90 mcg/actuation inhaler  Generic drug: albuterol     TOUJEO MAX U-300 SOLOSTAR 300 unit/mL (3 mL) insulin pen  Generic drug: insulin glargine U-300 conc               Where to Get Your Medications        These medications were sent to [facility]  [address] [place] [address]      Hours: Mon-Fri, 8a-5:30p Phone: [phone]   linezolid 600 mg Tab  pantoprazole 40 MG tablet         Discharge Information:   Diet:  Normal    Physical Activity:  As tolerated    Instructions:  1. Take all medications as prescribed  2. Keep all follow-up appointments  3. Return to the hospital or call your primary care physicians if any worsening symptoms such as worsening cellulitis or fever occur.      Follow-Up Appointments:   Follow-up Information       Waqar Das MD. Schedule an appointment as soon as possible for a visit in 2 week(s).    Specialty: Infectious Diseases  Contact information:  [address] [facility] [id number] [phone]                            Follow-up Information       Waqar Das MD. Schedule an appointment as soon as possible for a visit in 2 week(s).    Specialty: Infectious Diseases  Contact information:  [address] [facility] [id number] [phone]                            Future Appointments   Date Time Provider Department Center   9/21/2022  9:30 AM Olesya Felix MD [facility] ENRIQUE Forbes MD  [facility] Internal Medicine, [facility] Medicine Team B

## 2022-09-16 LAB
BACTERIA BLD CULT: NORMAL
BACTERIA BLD CULT: NORMAL

## 2022-09-19 ENCOUNTER — PATIENT OUTREACH (OUTPATIENT)
Dept: ADMINISTRATIVE | Facility: CLINIC | Age: 67
End: 2022-09-19
Payer: MEDICARE

## 2022-09-19 NOTE — PROGRESS NOTES
C3 nurse spoke with Alfa Ortiz  for a [facility] post hospital discharge follow up call. The patient has a scheduled HOSFU appointment with [facility] - internist on 9/19/22 @ 1030. Pt was initially scheduled with Olesya Felix, but cancelled that appt since he saw his internist today.

## 2022-12-19 PROBLEM — A41.9 SEPSIS: Status: RESOLVED | Noted: 2022-09-10 | Resolved: 2022-12-19

## 2022-12-19 PROBLEM — K92.0 COFFEE GROUND EMESIS: Status: RESOLVED | Noted: 2022-09-10 | Resolved: 2022-12-19
